# Patient Record
Sex: FEMALE | Race: OTHER | Employment: FULL TIME | URBAN - METROPOLITAN AREA
[De-identification: names, ages, dates, MRNs, and addresses within clinical notes are randomized per-mention and may not be internally consistent; named-entity substitution may affect disease eponyms.]

---

## 2017-10-23 ENCOUNTER — HOSPITAL ENCOUNTER (OUTPATIENT)
Dept: PHYSICAL THERAPY | Age: 57
Discharge: HOME | End: 2017-10-23
Payer: COMMERCIAL

## 2017-10-23 PROCEDURE — 97162 PT EVAL MOD COMPLEX 30 MIN: CPT

## 2017-10-23 PROCEDURE — 97110 THERAPEUTIC EXERCISES: CPT

## 2017-10-23 NOTE — PROGRESS NOTES
Ambulatory/Rehab Services H2 Model Falls Risk Assessment    Risk Factor Pts. ·   Confusion/Disorientation/Impulsivity  []    4 ·   Symptomatic Depression  []   2 ·   Altered Elimination  []   1 ·   Dizziness/Vertigo  []   1 ·   Gender (Male)  []   1 ·   Any administered antiepileptics (anticonvulsants):  []   2 ·   Any administered benzodiazepines:  []   1 ·   Visual Impairment (specify):  []   1 ·   Portable Oxygen Use  []   1 ·   Orthostatic ? BP  []   1 ·   History of Recent Falls (within 3 mos.)  []   5     Ability to Rise from Chair (choose one) Pts. ·   Ability to rise in a single movement  []   0 ·   Pushes up, successful in one attempt  []   1 ·   Multiple attempts, but successful  []   3 ·   Unable to rise without assistance  []   4   Total: (5 or greater = High Risk) 0     Falls Prevention Plan:   []                Physical Limitations to Exercise (specify):   []                Mobility Assistance Device (type):   []                Exercise/Equipment Adaptation (specify):    ©2010 [facility] [address] Patent #[id number].  Federal Law prohibits the replication, distribution or use without written permission from [facility] [employer]

## 2017-10-23 NOTE — PROGRESS NOTES
Tyler Helton  : 1960  Payor: Mendez Mnedoza / Plan: Vitor Bergeron PPO / Product Type: PPO /  [id number]  [facility], [address]  [phone]   [phone]       OUTPATIENT PHYSICAL THERAPY:Initial Assessment and Treatment Day: Day of Assessment 10/23/2017    ICD-10: Treatment Diagnosis:  M75.51  Bursitis of right shoulder   M70.61  Trochanteric bursitis, right hip  Precautions/Allergies:   Review of patient's allergies indicates no known allergies. Fall Risk Score: 0 (? 5 = High Risk)  MD Orders: PT EVAL AND TREATMENT BURSITIS RIGHT SHOULDER and RIGHT HIP BURSITIS  MEDICAL/REFERRING DIAGNOSIS:  Bursitis of right shoulder [M75.51]  Trochanteric bursitis, right hip [M70.61]   DATE OF ONSET: 2017   REFERRING PHYSICIAN: Stanislav Sarabia MD  RETURN PHYSICIAN APPOINTMENT:  Pending      INITIAL ASSESSMENT:    Tyler Helton is a  pleasant middle aged female with poor scapular posture and weakness. There is point tenderness in the right supraspinatus region, also in the right greater trochanter region. She demonstrates pain with upper elevation and (+) IT band test. Skilled PT is indicated for this patient's right shoulder for strengthening and mobility. Right                              AROM   STRENGTH   PROM  NORMALS  Flexion                    140*         3+                 150*              160  ABDUCTION          120*         3+                 130 *              160  Extension                60            4                      60                 60  ER                              60            4                     65                 85   IR                               L3             4                      50                 T12   Scapular strength at 3/5 . PROBLEM LIST (Impacting functional limitations):  1. Decreased Strength  2.  Decreased ADL/Functional Activities  3. Decreased Activity Tolerance  4. Decreased Work Simplification/Energy Conservation Techniques  5. Decreased Flexibility/Joint Mobility  6. Decreased [place] with Home Exercise Program    INTERVENTIONS PLANNED :   1. Manual Therapy   2. Therapeutic Exercise   3. Modalities Cold and Hot    TREATMENT PLAN:  Effective Dates: 10/23/2017 TO 1/23/2018    Frequency/Duration: up to 3 times a week for 12 weeks for right shoulder and right hip dysfunction. GOALS: (Goals have been discussed and agreed upon with patient.)  Rehabilitation Potential For Stated Goals: Good  Time Frame: 6 weeks   1. Decrease pain below 3/10 for the right shoulder with beginning home ADL's / shoulder mobility. 2. Decrease DASH to 25 in the right shoulder to indicate functional improvement and to demonstrate improved range of motion and functional improvement of the shoulder. 3. Patient to be independent with an initial HEP for the right shoulder for mobility and strengthening. DISCHARGE GOALS:   12 weeks       1. Decrease pain below 1/10 for the left shoulder and right hip with home ADL's / shoulder/hip  mobility. 2. Decrease DASH to 15 in the right shoulder to indicate functional improvement and to demonstrate improved strength and mobility of the right shoulder    3. Patient to be independent with an advanced HEP for the right shoulder and right hip for mobility and strengthening. Regarding [employer] therapy, I certify that the treatment plan above will be carried out by a therapist or under their direction. Thank you for this referral,  Dayanna Lee PT     Referring Physician Signature: Barry Lizarraga MD              Date                    The information in this section was collected on 10/23/2017 (except where otherwise noted).     HISTORY:   History of Present Injury/Illness (Reason for Referral):  Patient is describing pain in the right shoulder with impingement type elevation especially with initiation of abduction . Scapular weakness is present and additional history of frozen shoulder. She is reporting flare up of this pain for the past three months. She is also reporting pain in the right hip greater trochanter region. Past Medical History/Comorbidities:   Ms. Darcy Cash  has a past medical history of Nausea & vomiting. She also has no past medical history of Aneurysm ([address] Arrhythmia; Arthritis; Asthma; Autoimmune disease ([address] CAD (coronary artery disease); Cancer ([address] Chronic kidney disease; Chronic pain; Coagulation defects; COPD; Diabetes ([address] Difficult intubation; GERD (gastroesophageal reflux disease); Heart failure ([address] Hypertension; Liver disease; Malignant hyperthermia due to anesthesia; Morbid obesity ([address] Other ill-defined conditions(799.89); Pseudocholinesterase deficiency; Psychiatric disorder; PUD (peptic ulcer disease); Seizures ([facility] Stroke [facility]); Thromboembolus ([facility] [address] Thyroid disease; Unspecified adverse effect of anesthesia; or Unspecified sleep apnea. Ms. Darcy Cash  has a past surgical history that includes tonsillectomy. Social History/Living Environment:     No limitations. Prior Level of Function/Work/Activity:  Patient had been independent with all functional mobility. Reports prior decrease in shoulder ROM secondary to prior frozen shoulder. Dominant Side:         RIGHTPersonal Factors:          Sex:  female        Age:  49 y.o. Current Medications:       Current Outpatient Prescriptions:     estradiol (ESTRACE) 1 mg tablet, Take 1 Tab by mouth daily. , Disp: 30 Tab, Rfl: 0    medroxyPROGESTERone (PROVERA) 2.5 mg tablet, Take 1 Tab by mouth daily. , Disp: 30 Tab, Rfl: 0    ketorolac (TORADOL) 10 mg tablet, Take 1 Tab by mouth every six (6) hours as needed for Pain., Disp: 20 Tab, Rfl: 0    promethazine (PHENERGAN) 25 mg tablet, Take 1 Tab by mouth every six (6) hours as needed for Nausea., Disp: 30 Tab, Rfl: 0    citalopram (CELEXA) 40 mg tablet, Take 40 mg by mouth nightly.  , Disp: , Rfl:    Date Last Reviewed:  10/24/2017     Number of Personal Factors/Comorbidities that affect the Plan of Care: 1-2: MODERATE COMPLEXITY   EXAMINATION:   Right  Hip abduction at 3/5 and palpably tender right greater trochanter region.   (+) OBERS   Right Shoulder                              AROM   STRENGTH   PROM  NORMALS  Flexion                    140*        3+                 150*              160  ABDUCTION          120*         3+                130 *              160  Extension               60            4                  60                 60  ER                             60            4                 65                 85   IR                              L3            4                  50               T12  *indicates antalgic mobility     Strength:See Above    Palpation :    Palpably tender right supraspinatus and right greater trochanter region. Special Tests:     (+) OBERS   Neurological Screen:    Myotomes: intact. Dermatomes: intact bilateral UE's light touch and proprioception. Reflexes: normal          Functional Mobility: intact  Balance: intact           Body Structures Involved:  1. Metabolic  2. Bones  3. Joints  4. Muscles  5. Ligaments Body Functions Affected:  1. Sensory/Pain  2. Neuromusculoskeletal  3. Movement Related  4. Skin Related  5. Metobolic/Endocrine Activities and Participation Affected:  1. Learning and Applying Knowledge  2. General Tasks and Demands  3. Mobility  4. Interpersonal Interactions and Relationships  5. Community, Social and [place]   Number of elements (examined above) that affect the Plan of Care: 3: MODERATE COMPLEXITY   CLINICAL PRESENTATION:   Presentation: Evolving clinical presentation with changing clinical characteristics: MODERATE COMPLEXITY   CLINICAL DECISION MAKING:   Outcome Measure:    Tool Used: Disabilities of the Arm, Shoulder and Hand (DASH) Questionnaire - Quick Version  Score:  Initial: 26/55  Most Recent: X/55 (Date: -- )   Interpretation of Score: The DASH is designed to measure the activities of daily living in person's with upper extremity dysfunction or pain. Each section is scored on a 1-5 scale, 5 representing the greatest disability. The scores of each section are added together for a total score of 55. Score 11 12-19 20-28 29-37 38-45 46-54 55   Modifier CH CI CJ CK CL CM CN     ? Changing and Maintaining Body Position:     - CURRENT STATUS: CJ - 20%-39% impaired, limited or restricted    - GOAL STATUS: CI - 1%-19% impaired, limited or restricted    - D/C STATUS:  ---------------To be determined---------------       Lower Extremity Functional Scale (LEFS)  Score:  Initial: 66/80 Most Recent: X/80 (Date: -- )   Interpretation of Score: 20 questions each scored on a 5 point scale with 0 representing \"extreme difficulty or unable to perform\" and 4 representing \"no difficulty\". The lower the score, the greater the functional disability. 80/80 represents no disability. Minimal detectable change is 9 points. Score 80 79-63 62-48 47-32 31-16 15-1 0   Modifier CH CI CJ CK CL CM CN         Medical Necessity:   · Patient demonstrates good rehab potential due to higher previous functional level. Reason for Services/Other Comments:  · Patient continues to require skilled intervention due to medical complications, patient unable to attend/participate in therapy as expected and right shoulder impingement and pain with elevation. .   Use of outcome tool(s) and clinical judgement create a POC that gives a: Questionable prediction of patient's progress: MODERATE COMPLEXITY            TREATMENT:   (In addition to Assessment/Re-Assessment sessions the following treatments were rendered)  Pre-treatment Symptoms/Complaints:  Right shoulder at 5/10 with elevation and abduction   Pain: Initial:     5/10 in the right shoulder , 1/10 in the right greater trochanter region  Post Session:  4/10 with elevation in the right shoulder, 1/10 right greater trochanter region. THERAPEUTIC EXERCISE: (15 minutes):  Exercises per grid below to improve mobility, strength, balance and dynamic movement of shoulder - right to improve functional lifting, carrying, reaching and catching. Required minimal visual, verbal, manual and tactile cues to promote proper body alignment. Progressed repetitions as indicated. Date:  10/23/2017 Date:   Date:     Activity/Exercise Parameters Parameters Parameters   Upper trap shrugs  10 x's      Scapular clocks  15 x's      IT band stretch right side  3 x's      Postural reeducation  5 mins                          [employer] Portal  Treatment/Session Assessment:    · Response to Treatment: No specific pain complaints post evaluation and treatment. · Compliance with Program/Exercises: Will assess as treatment progresses. · Recommendations/Intent for next treatment session: \"Next visit will focus on advancements to more challenging activities, reduction in assistance provided and right shoulder mobility and scapular strengthening to decrease impingement in the right shoulder. \".   Total Treatment Duration:  PT Patient Time In/Time Out  Time In: 9780  Time Out: Shakira Parekh

## 2017-10-26 ENCOUNTER — HOSPITAL ENCOUNTER (OUTPATIENT)
Dept: PHYSICAL THERAPY | Age: 57
Discharge: HOME | End: 2017-10-26
Payer: COMMERCIAL

## 2017-10-26 PROCEDURE — 97140 MANUAL THERAPY 1/> REGIONS: CPT

## 2017-10-26 PROCEDURE — 97110 THERAPEUTIC EXERCISES: CPT

## 2017-10-26 NOTE — PROGRESS NOTES
Henry Zepeda  : 1960  Payor: Beba Emerson / Plan: Saul Ramirez PPO / Product Type: PPO /  [address]  at [facility]  [address] [facility], [place] [address] [address] [facility]  [phone]   [phone]       OUTPATIENT PHYSICAL THERAPY:Daily Note 10/26/2017    ICD-10: Treatment Diagnosis:  M75.51  Bursitis of right shoulder   M70.61  Trochanteric bursitis, right hip  Precautions/Allergies:   Review of patient's allergies indicates no known allergies. Fall Risk Score: 0 (? 5 = High Risk)  MD Orders: PT EVAL AND TREATMENT BURSITIS RIGHT SHOULDER and RIGHT HIP BURSITIS  MEDICAL/REFERRING DIAGNOSIS:  Bursitis of right shoulder [M75.51]  Trochanteric bursitis, right hip [M70.61]   DATE OF ONSET: 2017   REFERRING PHYSICIAN: Marnie Naqvi MD  RETURN PHYSICIAN APPOINTMENT:  Pending      INITIAL ASSESSMENT:    Henry Zepeda is a  pleasant middle aged female with poor scapular posture and weakness. There is point tenderness in the right supraspinatus region, also in the right greater trochanter region. She demonstrates pain with upper elevation and (+) IT band test. Skilled PT is indicated for this patient's right shoulder for strengthening and mobility. Right                              AROM   STRENGTH   PROM  NORMALS  Flexion                    140*         3+                 150*              160  ABDUCTION          120*         3+                 130 *              160  Extension                60            4                      60                 60  ER                              60            4                     65                 85   IR                               L3             4                      50                 T12   Scapular strength at 3/5 . PROBLEM LIST (Impacting functional limitations):  1. Decreased Strength  2. Decreased ADL/Functional Activities  3.  Decreased Activity Tolerance  4. Decreased Work Simplification/Energy Conservation Techniques  5. Decreased Flexibility/Joint Mobility  6. Decreased [place] with Home Exercise Program    INTERVENTIONS PLANNED :   1. Manual Therapy   2. Therapeutic Exercise   3. Modalities Cold and Hot    TREATMENT PLAN:  Effective Dates: 10/23/2017 TO 1/23/2018    Frequency/Duration: up to 3 times a week for 12 weeks for right shoulder and right hip dysfunction. GOALS: (Goals have been discussed and agreed upon with patient.)  Rehabilitation Potential For Stated Goals: Good  Time Frame: 6 weeks   1. Decrease pain below 3/10 for the right shoulder with beginning home ADL's / shoulder mobility. 2. Decrease DASH to 25 in the right shoulder to indicate functional improvement and to demonstrate improved range of motion and functional improvement of the shoulder. 3. Patient to be independent with an initial HEP for the right shoulder for mobility and strengthening. DISCHARGE GOALS:   12 weeks       1. Decrease pain below 1/10 for the left shoulder and right hip with home ADL's / shoulder/hip  mobility. 2. Decrease DASH to 15 in the right shoulder to indicate functional improvement and to demonstrate improved strength and mobility of the right shoulder    3. Patient to be independent with an advanced HEP for the right shoulder and right hip for mobility and strengthening. Regarding [employer] therapy, I certify that the treatment plan above will be carried out by a therapist or under their direction. Thank you for this referral,  Mark Lozano PT     Referring Physician Signature: Valentina Awad MD              Date                    The information in this section was collected on 10/23/2017 (except where otherwise noted).     HISTORY:   History of Present Injury/Illness (Reason for Referral):  Patient is describing pain in the right shoulder with impingement type elevation especially with initiation of abduction . Scapular weakness is present and additional history of frozen shoulder. She is reporting flare up of this pain for the past three months. She is also reporting pain in the right hip greater trochanter region. Past Medical History/Comorbidities:   Ms. Ana Sarabia  has a past medical history of Nausea & vomiting. She also has no past medical history of Aneurysm ([address] Arrhythmia; Arthritis; Asthma; Autoimmune disease ([address] CAD (coronary artery disease); Cancer ([address] Chronic kidney disease; Chronic pain; Coagulation defects; COPD; Diabetes ([address] Difficult intubation; GERD (gastroesophageal reflux disease); Heart failure ([address] Hypertension; Liver disease; Malignant hyperthermia due to anesthesia; Morbid obesity ([address] Other ill-defined conditions(799[address]89); Pseudocholinesterase deficiency; Psychiatric disorder; PUD (peptic ulcer disease); Seizures ([facility] [address] Stroke [facility]); Thromboembolus ([facility] [address] [facility] Thyroid disease; Unspecified adverse effect of anesthesia; or Unspecified sleep apnea. Ms. Ana Sarabia  has a past surgical history that includes tonsillectomy. Social History/Living Environment:     No limitations. Prior Level of Function/Work/Activity:  Patient had been independent with all functional mobility. Reports prior decrease in shoulder ROM secondary to prior frozen shoulder. Dominant Side:         RIGHTPersonal Factors:          Sex:  female        Age:  49 y.o. Current Medications:       Current Outpatient Prescriptions:     estradiol (ESTRACE) 1 mg tablet, Take 1 Tab by mouth daily. , Disp: 30 Tab, Rfl: 0    medroxyPROGESTERone (PROVERA) 2.5 mg tablet, Take 1 Tab by mouth daily. , Disp: 30 Tab, Rfl: 0    ketorolac (TORADOL) 10 mg tablet, Take 1 Tab by mouth every six (6) hours as needed for Pain., Disp: 20 Tab, Rfl: 0    promethazine (PHENERGAN) 25 mg tablet, Take 1 Tab by mouth every six (6) hours as needed for Nausea., Disp: 30 Tab, Rfl: 0    citalopram (CELEXA) 40 mg tablet, Take 40 mg by mouth nightly.  , Disp: , Rfl:    Date Last Reviewed:  10/29/2017     Number of Personal Factors/Comorbidities that affect the Plan of Care: 1-2: MODERATE COMPLEXITY   EXAMINATION:   Right  Hip abduction at 3/5 and palpably tender right greater trochanter region.   (+) OBERS   Right Shoulder                              AROM   STRENGTH   PROM  NORMALS  Flexion                    140*        3+                 150*              160  ABDUCTION          120*         3+                130 *              160  Extension               60            4                  60                 60  ER                             60            4                 65                 85   IR                              L3            4                  50               T12  *indicates antalgic mobility     Strength:See Above    Palpation :    Palpably tender right supraspinatus and right greater trochanter region. Special Tests:     (+) OBERS   Neurological Screen:    Myotomes: intact. Dermatomes: intact bilateral UE's light touch and proprioception. Reflexes: normal          Functional Mobility: intact  Balance: intact           Body Structures Involved:  1. Metabolic  2. Bones  3. Joints  4. Muscles  5. Ligaments Body Functions Affected:  1. Sensory/Pain  2. Neuromusculoskeletal  3. Movement Related  4. Skin Related  5. Metobolic/Endocrine Activities and Participation Affected:  1. Learning and Applying Knowledge  2. General Tasks and Demands  3. Mobility  4. Interpersonal Interactions and Relationships  5. Community, Social and [place]   Number of elements (examined above) that affect the Plan of Care: 3: MODERATE COMPLEXITY   CLINICAL PRESENTATION:   Presentation: Evolving clinical presentation with changing clinical characteristics: MODERATE COMPLEXITY   CLINICAL DECISION MAKING:   Outcome Measure:    Tool Used: Disabilities of the Arm, Shoulder and Hand (DASH) Questionnaire - Quick Version  Score:  Initial: 26/55  Most Recent: X/55 (Date: -- )   Interpretation of Score: The DASH is designed to measure the activities of daily living in person's with upper extremity dysfunction or pain. Each section is scored on a 1-5 scale, 5 representing the greatest disability. The scores of each section are added together for a total score of 55. Score 11 12-19 20-28 29-37 38-45 46-54 55   Modifier CH CI CJ CK CL CM CN     ? Changing and Maintaining Body Position:     - CURRENT STATUS: CJ - 20%-39% impaired, limited or restricted    - GOAL STATUS: CI - 1%-19% impaired, limited or restricted    - D/C STATUS:  ---------------To be determined---------------       Lower Extremity Functional Scale (LEFS)  Score:  Initial: 66/80 Most Recent: X/80 (Date: -- )   Interpretation of Score: 20 questions each scored on a 5 point scale with 0 representing \"extreme difficulty or unable to perform\" and 4 representing \"no difficulty\". The lower the score, the greater the functional disability. 80/80 represents no disability. Minimal detectable change is 9 points. Score 80 79-63 62-48 47-32 31-16 15-1 0   Modifier CH CI CJ CK CL CM CN         Medical Necessity:   · Patient demonstrates good rehab potential due to higher previous functional level. Reason for Services/Other Comments:  · Patient continues to require skilled intervention due to medical complications, patient unable to attend/participate in therapy as expected and right shoulder impingement and pain with elevation. .   Use of outcome tool(s) and clinical judgement create a POC that gives a: Questionable prediction of patient's progress: MODERATE COMPLEXITY            TREATMENT:   (In addition to Assessment/Re-Assessment sessions the following treatments were rendered)  Pre-treatment Symptoms/Complaints:  Right shoulder at 5/10 with elevation and abduction   Pain: Initial:     5/10 in the right shoulder , 1/10 in the right greater trochanter region  Post Session:  4/10 with elevation in the right shoulder, 1/10 right greater trochanter region. THERAPEUTIC EXERCISE: (30 minutes):  Exercises per grid below to improve mobility, strength, balance and dynamic movement of shoulder - right to improve functional lifting, carrying, reaching and catching. Required minimal visual, verbal, manual and tactile cues to promote proper body alignment. Progressed repetitions as indicated. Date:  10/26/2017 Date:   Date:     Activity/Exercise Parameters Parameters Parameters   Upper trap shrugs  10 x's      Scapular clocks  15 x's      IT band stretch right side  3 x's      Postural reeducation  5 mins      UBE  3.0 3 mins forward and 3 mins backward      Against wall [place]  10 x's            MANUAL THERAPY: (10 mins  minutes): Joint mobilization, Soft tissue mobilization and Manipulation was utilized and necessary because of the patient's restricted joint motion and restricted motion of soft tissue. Seated effleurage and petrissage and digital accupressure to the scapular region and rhomboids for trigger point release. Modalities for pain control 5 min :   Right shoulder and scapular region with layered moist hot pack and increase blood flow. [facility] Portal  Treatment/Session Assessment:    · Response to Treatment: No specific pain complaints post treatment. Patient demonstrates poor postural control and decreased strength in the scapular musculature. · Compliance with Program/Exercises: initial postural strengthening and stabilization. · Recommendations/Intent for next treatment session: \"Next visit will focus on advancements to more challenging activities, reduction in assistance provided and right shoulder mobility and scapular strengthening to decrease impingement in the right shoulder. \".   Total Treatment Duration:  PT Patient Time In/Time Out  Time In: 2635  Time Out: 53 Adam Kumar

## 2017-10-30 ENCOUNTER — HOSPITAL ENCOUNTER (OUTPATIENT)
Dept: PHYSICAL THERAPY | Age: 57
Discharge: HOME | End: 2017-10-30
Payer: COMMERCIAL

## 2017-10-30 PROCEDURE — 97110 THERAPEUTIC EXERCISES: CPT

## 2017-10-30 PROCEDURE — 97140 MANUAL THERAPY 1/> REGIONS: CPT

## 2017-10-31 NOTE — PROGRESS NOTES
Stacy Staton  : 1960  Payor: Rodger Boone / Plan: Cici Renee PPO / Product Type: PPO /  [address]  at [facility]  [facility], [address] [place] [address]  [phone]   [phone]       OUTPATIENT PHYSICAL THERAPY:Daily Note 10/30/2017    ICD-10: Treatment Diagnosis:  M75.51  Bursitis of right shoulder   M70.61  Trochanteric bursitis, right hip  Precautions/Allergies:   Review of patient's allergies indicates no known allergies. Fall Risk Score: 0 (? 5 = High Risk)  MD Orders: PT EVAL AND TREATMENT BURSITIS RIGHT SHOULDER and RIGHT HIP BURSITIS  MEDICAL/REFERRING DIAGNOSIS:  Bursitis of right shoulder [M75.51]  Trochanteric bursitis, right hip [M70.61]   DATE OF ONSET: 2017   REFERRING PHYSICIAN: Sharee Fink MD  RETURN PHYSICIAN APPOINTMENT:  Pending      INITIAL ASSESSMENT:    Stacy Staton is a  pleasant middle aged female with poor scapular posture and weakness. There is point tenderness in the right supraspinatus region, also in the right greater trochanter region. She demonstrates pain with upper elevation and (+) IT band test. Skilled PT is indicated for this patient's right shoulder for strengthening and mobility. Right                              AROM   STRENGTH   PROM  NORMALS  Flexion                    140*         3+                 150*              160  ABDUCTION          120*         3+                 130 *              160  Extension                60            4                      60                 60  ER                              60            4                     65                 85   IR                               L3             4                      50                 T12   Scapular strength at 3/5 . PROBLEM LIST (Impacting functional limitations):  1. Decreased Strength  2. Decreased ADL/Functional Activities  3.  Decreased Activity Tolerance  4. Decreased Work Simplification/Energy Conservation Techniques  5. Decreased Flexibility/Joint Mobility  6. Decreased [place] with Home Exercise Program    INTERVENTIONS PLANNED :   1. Manual Therapy   2. Therapeutic Exercise   3. Modalities Cold and Hot    TREATMENT PLAN:  Effective Dates: 10/23/2017 TO 1/23/2018    Frequency/Duration: up to 3 times a week for 12 weeks for right shoulder and right hip dysfunction. GOALS: (Goals have been discussed and agreed upon with patient.)  Rehabilitation Potential For Stated Goals: Good  Time Frame: 6 weeks   1. Decrease pain below 3/10 for the right shoulder with beginning home ADL's / shoulder mobility. 2. Decrease DASH to 25 in the right shoulder to indicate functional improvement and to demonstrate improved range of motion and functional improvement of the shoulder. 3. Patient to be independent with an initial HEP for the right shoulder for mobility and strengthening. DISCHARGE GOALS:   12 weeks       1. Decrease pain below 1/10 for the left shoulder and right hip with home ADL's / shoulder/hip  mobility. 2. Decrease DASH to 15 in the right shoulder to indicate functional improvement and to demonstrate improved strength and mobility of the right shoulder    3. Patient to be independent with an advanced HEP for the right shoulder and right hip for mobility and strengthening. Regarding [employer] therapy, I certify that the treatment plan above will be carried out by a therapist or under their direction. Thank you for this referral,  Izzy Beard PT     Referring Physician Signature: Crystal Estes MD              Date                    The information in this section was collected on 10/23/2017 (except where otherwise noted).     HISTORY:   History of Present Injury/Illness (Reason for Referral):  Patient is describing pain in the right shoulder with impingement type elevation especially with initiation of abduction . Scapular weakness is present and additional history of frozen shoulder. She is reporting flare up of this pain for the past three months. She is also reporting pain in the right hip greater trochanter region. Past Medical History/Comorbidities:   Ms. Moshe Padgett  has a past medical history of Nausea & vomiting. She also has no past medical history of Aneurysm ([address] Arrhythmia; Arthritis; Asthma; Autoimmune disease ([address] CAD (coronary artery disease); Cancer ([address] Chronic kidney disease; Chronic pain; Coagulation defects; COPD; Diabetes ([address] Difficult intubation; GERD (gastroesophageal reflux disease); Heart failure ([address] Hypertension; Liver disease; Malignant hyperthermia due to anesthesia; Morbid obesity ([address] Other ill-defined conditions(799.89); Pseudocholinesterase deficiency; Psychiatric disorder; PUD (peptic ulcer disease); Seizures ([address] Stroke [facility]); Thromboembolus ([address] Thyroid disease; Unspecified adverse effect of anesthesia; or Unspecified sleep apnea. Ms. Moshe Padgett  has a past surgical history that includes tonsillectomy. Social History/Living Environment:     No limitations. Prior Level of Function/Work/Activity:  Patient had been independent with all functional mobility. Reports prior decrease in shoulder ROM secondary to prior frozen shoulder. Dominant Side:         RIGHTPersonal Factors:          Sex:  female        Age:  49 y.o. Current Medications:       Current Outpatient Prescriptions:     estradiol (ESTRACE) 1 mg tablet, Take 1 Tab by mouth daily. , Disp: 30 Tab, Rfl: 0    medroxyPROGESTERone (PROVERA) 2.5 mg tablet, Take 1 Tab by mouth daily. , Disp: 30 Tab, Rfl: 0    ketorolac (TORADOL) 10 mg tablet, Take 1 Tab by mouth every six (6) hours as needed for Pain., Disp: 20 Tab, Rfl: 0    promethazine (PHENERGAN) 25 mg tablet, Take 1 Tab by mouth every six (6) hours as needed for Nausea., Disp: 30 Tab, Rfl: 0    citalopram (CELEXA) 40 mg tablet, Take 40 mg by mouth nightly.  , Disp: , Rfl:    Date Last Reviewed:  10/31/2017     Number of Personal Factors/Comorbidities that affect the Plan of Care: 1-2: MODERATE COMPLEXITY   EXAMINATION:   Right  Hip abduction at 3/5 and palpably tender right greater trochanter region.   (+) OBERS   Right Shoulder                              AROM   STRENGTH   PROM  NORMALS  Flexion                    140*        3+                 150*              160  ABDUCTION          120*         3+                130 *              160  Extension               60            4                  60                 60  ER                             60            4                 65                 85   IR                              L3            4                  50               T12  *indicates antalgic mobility     Strength:See Above    Palpation :    Palpably tender right supraspinatus and right greater trochanter region. Special Tests:     (+) OBERS   Neurological Screen:    Myotomes: intact. Dermatomes: intact bilateral UE's light touch and proprioception. Reflexes: normal          Functional Mobility: intact  Balance: intact           Body Structures Involved:  1. Metabolic  2. Bones  3. Joints  4. Muscles  5. Ligaments Body Functions Affected:  1. Sensory/Pain  2. Neuromusculoskeletal  3. Movement Related  4. Skin Related  5. Metobolic/Endocrine Activities and Participation Affected:  1. Learning and Applying Knowledge  2. General Tasks and Demands  3. Mobility  4. Interpersonal Interactions and Relationships  5. Community, Social and [place]   Number of elements (examined above) that affect the Plan of Care: 3: MODERATE COMPLEXITY   CLINICAL PRESENTATION:   Presentation: Evolving clinical presentation with changing clinical characteristics: MODERATE COMPLEXITY   CLINICAL DECISION MAKING:   Outcome Measure:    Tool Used: Disabilities of the Arm, Shoulder and Hand (DASH) Questionnaire - Quick Version  Score:  Initial: 26/55  Most Recent: X/55 (Date: -- )   Interpretation of Score: The DASH is designed to measure the activities of daily living in person's with upper extremity dysfunction or pain. Each section is scored on a 1-5 scale, 5 representing the greatest disability. The scores of each section are added together for a total score of 55. Score 11 12-19 20-28 29-37 38-45 46-54 55   Modifier CH CI CJ CK CL CM CN     ? Changing and Maintaining Body Position:     - CURRENT STATUS: CJ - 20%-39% impaired, limited or restricted    - GOAL STATUS: CI - 1%-19% impaired, limited or restricted    - D/C STATUS:  ---------------To be determined---------------       Lower Extremity Functional Scale (LEFS)  Score:  Initial: 66/80 Most Recent: X/80 (Date: -- )   Interpretation of Score: 20 questions each scored on a 5 point scale with 0 representing \"extreme difficulty or unable to perform\" and 4 representing \"no difficulty\". The lower the score, the greater the functional disability. 80/80 represents no disability. Minimal detectable change is 9 points. Score 80 79-63 62-48 47-32 31-16 15-1 0   Modifier  CI CJ CK CL CM CN         Medical Necessity:   · Patient demonstrates good rehab potential due to higher previous functional level. Reason for Services/Other Comments:  · Patient continues to require skilled intervention due to medical complications, patient unable to attend/participate in therapy as expected and right shoulder impingement and pain with elevation. .   Use of outcome tool(s) and clinical judgement create a POC that gives a: Questionable prediction of patient's progress: MODERATE COMPLEXITY            TREATMENT:   (In addition to Assessment/Re-Assessment sessions the following treatments were rendered)  Pre-treatment Symptoms/Complaints:  Right shoulder at 5/10 with elevation and abduction   Pain: Initial:     5/10 in the right shoulder , 1/10 in the right greater trochanter region  Post Session:  4/10 with elevation in the right shoulder, 1/10 right greater trochanter region. THERAPEUTIC EXERCISE: (30 minutes):  Exercises per grid below to improve mobility, strength, balance and dynamic movement of shoulder - right to improve functional lifting, carrying, reaching and catching. Required minimal visual, verbal, manual and tactile cues to promote proper body alignment. Progressed repetitions as indicated. Date:  10/30/2017 Date:   Date:     Activity/Exercise Parameters Parameters Parameters   Upper trap shrugs  10 x's      Scapular clocks  15 x's      IT band stretch right side  3 x's      Postural reeducation  5 mins      UBE  3.0 3 mins forward and 3 mins backward      Against wall [place]  10 x's      Middle trap       MANUAL THERAPY: (10 mins  minutes): Joint mobilization, Soft tissue mobilization and Manipulation was utilized and necessary because of the patient's restricted joint motion and restricted motion of soft tissue. Seated effleurage and petrissage and digital accupressure to the scapular region and rhomboids for trigger point release. And also grade II-III glide in the shoulder inferior   Modalities for pain control 5 min :   Right shoulder and scapular region with layered moist hot pack and increase blood flow. MedBridge Portal  Treatment/Session Assessment:    · Response to Treatment: No specific pain complaints post treatment. Patient demonstrates poor postural control and decreased strength in the scapular musculature. · Compliance with Program/Exercises: initial postural strengthening and stabilization. · Recommendations/Intent for next treatment session: \"Next visit will focus on advancements to more challenging activities, reduction in assistance provided and right shoulder mobility and scapular strengthening to decrease impingement in the right shoulder. \".   Total Treatment Duration:  PT Patient Time In/Time Out  Time In: 1600  Time Out: [address] [facility] [address]

## 2017-11-02 ENCOUNTER — HOSPITAL ENCOUNTER (OUTPATIENT)
Dept: PHYSICAL THERAPY | Age: 57
Discharge: HOME | End: 2017-11-02
Payer: COMMERCIAL

## 2017-11-06 ENCOUNTER — HOSPITAL ENCOUNTER (OUTPATIENT)
Dept: PHYSICAL THERAPY | Age: 57
Discharge: HOME | End: 2017-11-06
Payer: COMMERCIAL

## 2017-11-06 PROCEDURE — 97140 MANUAL THERAPY 1/> REGIONS: CPT

## 2017-11-06 PROCEDURE — 97110 THERAPEUTIC EXERCISES: CPT

## 2017-11-06 NOTE — PROGRESS NOTES
Juliet Guzman  : 1960  Payor: Ritesh Barrios / Plan: Jered Valente PPO / Product Type: PPO /  [address]  at [facility]  [address] [birth year], [address] [place], [place] [address] [address] [facility]  [phone]   [phone]       OUTPATIENT PHYSICAL THERAPY:Daily Note 2017    ICD-10: Treatment Diagnosis:  M75.51  Bursitis of right shoulder   M70.61  Trochanteric bursitis, right hip  Precautions/Allergies:   Review of patient's allergies indicates no known allergies. Fall Risk Score: 0 (? 5 = High Risk)  MD Orders: PT EVAL AND TREATMENT BURSITIS RIGHT SHOULDER and RIGHT HIP BURSITIS  MEDICAL/REFERRING DIAGNOSIS:  Bursitis of right shoulder [M75.51]  Trochanteric bursitis, right hip [M70.61]   DATE OF ONSET: 2017   REFERRING PHYSICIAN: Darius Montague MD  RETURN PHYSICIAN APPOINTMENT:  Pending      INITIAL ASSESSMENT:    Juliet Guzman is a  pleasant middle aged female with poor scapular posture and weakness. There is point tenderness in the right supraspinatus region, also in the right greater trochanter region. She demonstrates pain with upper elevation and (+) IT band test. Skilled PT is indicated for this patient's right shoulder for strengthening and mobility. Right                              AROM   STRENGTH   PROM  NORMALS  Flexion                    140*         3+                 150*              160  ABDUCTION          120*         3+                 130 *              160  Extension                60            4                      60                 60  ER                              60            4                     65                 85   IR                               L3             4                      50                 T12   Scapular strength at 3/5 . PROBLEM LIST (Impacting functional limitations):  1. Decreased Strength  2. Decreased ADL/Functional Activities  3.  Decreased Activity Tolerance  4. Decreased Work Simplification/Energy Conservation Techniques  5. Decreased Flexibility/Joint Mobility  6. Decreased [place] with Home Exercise Program    INTERVENTIONS PLANNED :   1. Manual Therapy   2. Therapeutic Exercise   3. Modalities Cold and Hot    TREATMENT PLAN:  Effective Dates: 10/23/2017 TO 1/23/2018    Frequency/Duration: up to 3 times a week for 12 weeks for right shoulder and right hip dysfunction. GOALS: (Goals have been discussed and agreed upon with patient.)  Rehabilitation Potential For Stated Goals: Good  Time Frame: 6 weeks   1. Decrease pain below 3/10 for the right shoulder with beginning home ADL's / shoulder mobility. 2. Decrease DASH to 25 in the right shoulder to indicate functional improvement and to demonstrate improved range of motion and functional improvement of the shoulder. 3. Patient to be independent with an initial HEP for the right shoulder for mobility and strengthening. DISCHARGE GOALS:   12 weeks       1. Decrease pain below 1/10 for the left shoulder and right hip with home ADL's / shoulder/hip  mobility. 2. Decrease DASH to 15 in the right shoulder to indicate functional improvement and to demonstrate improved strength and mobility of the right shoulder    3. Patient to be independent with an advanced HEP for the right shoulder and right hip for mobility and strengthening. Regarding [employer] therapy, I certify that the treatment plan above will be carried out by a therapist or under their direction. Thank you for this referral,  Izzy Beard PT     Referring Physician Signature: Crystal Estes MD              Date                    The information in this section was collected on 10/23/2017 (except where otherwise noted).     HISTORY:   History of Present Injury/Illness (Reason for Referral):  Patient is describing pain in the right shoulder with impingement type elevation especially with initiation of abduction . Scapular weakness is present and additional history of frozen shoulder. She is reporting flare up of this pain for the past three months. She is also reporting pain in the right hip greater trochanter region. Past Medical History/Comorbidities:   Ms. Eddie Estrella  has a past medical history of Nausea & vomiting. She also has no past medical history of Aneurysm ([address] Arrhythmia; Arthritis; Asthma; Autoimmune disease ([address] CAD (coronary artery disease); Cancer ([address] Chronic kidney disease; Chronic pain; Coagulation defects; COPD; Diabetes ([address] Difficult intubation; GERD (gastroesophageal reflux disease); Heart failure ([address] Hypertension; Liver disease; Malignant hyperthermia due to anesthesia; Morbid obesity ([address] Other ill-defined conditions(799.89); Pseudocholinesterase deficiency; Psychiatric disorder; PUD (peptic ulcer disease); Seizures ([facility] Stroke [facility]); Thromboembolus ([facility] [address] Thyroid disease; Unspecified adverse effect of anesthesia; or Unspecified sleep apnea. Ms. Eddie Estrella  has a past surgical history that includes tonsillectomy. Social History/Living Environment:     No limitations. Prior Level of Function/Work/Activity:  Patient had been independent with all functional mobility. Reports prior decrease in shoulder ROM secondary to prior frozen shoulder. Dominant Side:         RIGHTPersonal Factors:          Sex:  female        Age:  49 y.o. Current Medications:       Current Outpatient Prescriptions:     estradiol (ESTRACE) 1 mg tablet, Take 1 Tab by mouth daily. , Disp: 30 Tab, Rfl: 0    medroxyPROGESTERone (PROVERA) 2.5 mg tablet, Take 1 Tab by mouth daily. , Disp: 30 Tab, Rfl: 0    ketorolac (TORADOL) 10 mg tablet, Take 1 Tab by mouth every six (6) hours as needed for Pain., Disp: 20 Tab, Rfl: 0    promethazine (PHENERGAN) 25 mg tablet, Take 1 Tab by mouth every six (6) hours as needed for Nausea., Disp: 30 Tab, Rfl: 0    citalopram (CELEXA) 40 mg tablet, Take 40 mg by mouth nightly.  , Disp: , Rfl:    Date Last Reviewed:  11/8/2017     Number of Personal Factors/Comorbidities that affect the Plan of Care: 1-2: MODERATE COMPLEXITY   EXAMINATION:   Right  Hip abduction at 3/5 and palpably tender right greater trochanter region.   (+) OBERS   Right Shoulder                              AROM   STRENGTH   PROM  NORMALS  Flexion                    140*        3+                 150*              160  ABDUCTION          120*         3+                130 *              160  Extension               60            4                  60                 60  ER                             60            4                 65                 85   IR                              L3            4                  50               T12  *indicates antalgic mobility     Strength:See Above    Palpation :    Palpably tender right supraspinatus and right greater trochanter region. Special Tests:     (+) OBERS   Neurological Screen:    Myotomes: intact. Dermatomes: intact bilateral UE's light touch and proprioception. Reflexes: normal          Functional Mobility: intact  Balance: intact           Body Structures Involved:  1. Metabolic  2. Bones  3. Joints  4. Muscles  5. Ligaments Body Functions Affected:  1. Sensory/Pain  2. Neuromusculoskeletal  3. Movement Related  4. Skin Related  5. Metobolic/Endocrine Activities and Participation Affected:  1. Learning and Applying Knowledge  2. General Tasks and Demands  3. Mobility  4. Interpersonal Interactions and Relationships  5. Community, Social and [place]   Number of elements (examined above) that affect the Plan of Care: 3: MODERATE COMPLEXITY   CLINICAL PRESENTATION:   Presentation: Evolving clinical presentation with changing clinical characteristics: MODERATE COMPLEXITY   CLINICAL DECISION MAKING:   Outcome Measure:    Tool Used: Disabilities of the Arm, Shoulder and Hand (DASH) Questionnaire - Quick Version  Score:  Initial: 26/55  Most Recent: X/55 (Date: -- )   Interpretation of Score: The DASH is designed to measure the activities of daily living in person's with upper extremity dysfunction or pain. Each section is scored on a 1-5 scale, 5 representing the greatest disability. The scores of each section are added together for a total score of 55. Score 11 12-19 20-28 29-37 38-45 46-54 55   Modifier  CI CJ CK CL CM CN     ? Changing and Maintaining Body Position:     - CURRENT STATUS: CJ - 20%-39% impaired, limited or restricted    - GOAL STATUS: CI - 1%-19% impaired, limited or restricted    - D/C STATUS:  ---------------To be determined---------------       Lower Extremity Functional Scale (LEFS)  Score:  Initial: 66/80 Most Recent: X/80 (Date: -- )   Interpretation of Score: 20 questions each scored on a 5 point scale with 0 representing \"extreme difficulty or unable to perform\" and 4 representing \"no difficulty\". The lower the score, the greater the functional disability. 80/80 represents no disability. Minimal detectable change is 9 points. Score 80 79-63 62-48 47-32 31-16 15-1 0   Modifier  CI CJ CK CL CM CN         Medical Necessity:   · Patient demonstrates good rehab potential due to higher previous functional level. Reason for Services/Other Comments:  · Patient continues to require skilled intervention due to medical complications, patient unable to attend/participate in therapy as expected and right shoulder impingement and pain with elevation. .   Use of outcome tool(s) and clinical judgement create a POC that gives a: Questionable prediction of patient's progress: MODERATE COMPLEXITY            TREATMENT:   (In addition to Assessment/Re-Assessment sessions the following treatments were rendered)  Pre-treatment Symptoms/Complaints:  Right shoulder at 4/10 with elevation and abduction   Pain: Initial:     4/10 in the right shoulder , 1/10 in the right greater trochanter region  Post Session:  3/10 with elevation in the right shoulder, 1/10 right greater trochanter region. THERAPEUTIC EXERCISE: (30 minutes):  Exercises per grid below to improve mobility, strength, balance and dynamic movement of shoulder - right to improve functional lifting, carrying, reaching and catching. Required minimal visual, verbal, manual and tactile cues to promote proper body alignment. Progressed repetitions as indicated. Date:  11/6/2017 Date:   Date:     Activity/Exercise Parameters Parameters Parameters   Upper trap shrugs  15 x's      Scapular clocks  15 x's      IT band stretch right side  3 x's      Postural reeducation  5 mins      UBE  3.0 3 mins forward and 3 mins backward      Against wall [place]  15 x's      Middle trap  15 x's      Lower Trap  15 x's      MANUAL THERAPY: (10 mins  minutes): Joint mobilization, Soft tissue mobilization and Manipulation was utilized and necessary because of the patient's restricted joint motion and restricted motion of soft tissue. Seated effleurage and petrissage and digital accupressure to the scapular region and rhomboids for trigger point release. And also grade II-III glide in the shoulder inferior   Modalities for pain control 5 min :   Right shoulder and scapular region with layered moist hot pack and increase blood flow. MedBridge Portal  Treatment/Session Assessment:    · Response to Treatment: No specific pain complaints post treatment. Patient demonstrates improving postural control and decreased strength in the scapular musculature. · Compliance with Program/Exercises: initial postural strengthening and stabilization. · Recommendations/Intent for next treatment session: \"Next visit will focus on advancements to more challenging activities, reduction in assistance provided and right shoulder mobility and scapular strengthening to decrease impingement in the right shoulder. \".   Total Treatment Duration:  PT Patient Time In/Time Out  Time In: 1544  Time Out: [address], [address] [facility]

## 2017-11-15 ENCOUNTER — HOSPITAL ENCOUNTER (OUTPATIENT)
Dept: PHYSICAL THERAPY | Age: 57
Discharge: HOME | End: 2017-11-15
Payer: COMMERCIAL

## 2017-11-15 PROCEDURE — 97110 THERAPEUTIC EXERCISES: CPT

## 2017-11-15 PROCEDURE — 97140 MANUAL THERAPY 1/> REGIONS: CPT

## 2017-11-15 NOTE — PROGRESS NOTES
Dallas Yanes  : 1960  Payor: Rhiannon Mir / Plan: Jie Matthew PPO / Product Type: PPO /  [address]  at [address] [facility] [address], [address] [facility], [place] [address] [address] [facility]  [phone]   [phone]       OUTPATIENT PHYSICAL THERAPY:Daily Note 11/15/2017    ICD-10: Treatment Diagnosis:  M75.51  Bursitis of right shoulder   M70.61  Trochanteric bursitis, right hip  Precautions/Allergies:   Review of patient's allergies indicates no known allergies. Fall Risk Score: 0 (? 5 = High Risk)  MD Orders: PT EVAL AND TREATMENT BURSITIS RIGHT SHOULDER and RIGHT HIP BURSITIS  MEDICAL/REFERRING DIAGNOSIS:  Bursitis of right shoulder [M75.51]  Trochanteric bursitis, right hip [M70.61]   DATE OF ONSET: 2017   REFERRING PHYSICIAN: Robel Acevedo MD  RETURN PHYSICIAN APPOINTMENT:  Pending      INITIAL ASSESSMENT:    Dallas Yanes is a  pleasant middle aged female with poor scapular posture and weakness. There is point tenderness in the right supraspinatus region, also in the right greater trochanter region. She demonstrates pain with upper elevation and (+) IT band test. Skilled PT is indicated for this patient's right shoulder for strengthening and mobility. Right                              AROM   STRENGTH   PROM  NORMALS  Flexion                    140*         3+                 150*              160  ABDUCTION          120*         3+                 130 *              160  Extension                60            4                      60                 60  ER                              60            4                     65                 85   IR                               L3             4                      50                 T12   Scapular strength at 3/5 . PROBLEM LIST (Impacting functional limitations):  1. Decreased Strength  2. Decreased ADL/Functional Activities  3.  Decreased Activity Tolerance  4. Decreased Work Simplification/Energy Conservation Techniques  5. Decreased Flexibility/Joint Mobility  6. Decreased [place] with Home Exercise Program    INTERVENTIONS PLANNED :   1. Manual Therapy   2. Therapeutic Exercise   3. Modalities Cold and Hot    TREATMENT PLAN:  Effective Dates: 10/23/2017 TO 1/23/2018    Frequency/Duration: up to 3 times a week for 12 weeks for right shoulder and right hip dysfunction. GOALS: (Goals have been discussed and agreed upon with patient.)  Rehabilitation Potential For Stated Goals: Good  Time Frame: 6 weeks   1. Decrease pain below 3/10 for the right shoulder with beginning home ADL's / shoulder mobility. 2. Decrease DASH to 25 in the right shoulder to indicate functional improvement and to demonstrate improved range of motion and functional improvement of the shoulder. 3. Patient to be independent with an initial HEP for the right shoulder for mobility and strengthening. DISCHARGE GOALS:   12 weeks       1. Decrease pain below 1/10 for the left shoulder and right hip with home ADL's / shoulder/hip  mobility. 2. Decrease DASH to 15 in the right shoulder to indicate functional improvement and to demonstrate improved strength and mobility of the right shoulder    3. Patient to be independent with an advanced HEP for the right shoulder and right hip for mobility and strengthening. Regarding [employer] therapy, I certify that the treatment plan above will be carried out by a therapist or under their direction. Thank you for this referral,  Mark Lozano PT     Referring Physician Signature: Valentina Awad MD              Date                    The information in this section was collected on 10/23/2017 (except where otherwise noted).     HISTORY:   History of Present Injury/Illness (Reason for Referral):  Patient is describing pain in the right shoulder with impingement type elevation especially with initiation of abduction . Scapular weakness is present and additional history of frozen shoulder. She is reporting flare up of this pain for the past three months. She is also reporting pain in the right hip greater trochanter region. Past Medical History/Comorbidities:   Ms. Diana Kovacs  has a past medical history of Anxiety; Nausea & vomiting; and Panic attacks. She also has no past medical history of Aneurysm ([address] Arrhythmia; Arthritis; Asthma; Autoimmune disease ([address] CAD (coronary artery disease); Cancer ([address] Chronic kidney disease; Chronic pain; Coagulation defects; COPD; Diabetes ([address] Difficult intubation; GERD (gastroesophageal reflux disease); Heart failure ([address] Hypertension; Liver disease; Malignant hyperthermia due to anesthesia; Morbid obesity ([address] Other ill-defined conditions(799.89); Pseudocholinesterase deficiency; Psychiatric disorder; PUD (peptic ulcer disease); Seizures ([facility] Stroke [facility]); Thromboembolus ([facility] [address] [facility] Thyroid disease; Unspecified adverse effect of anesthesia; or Unspecified sleep apnea. Ms. Diana Kovacs  has a past surgical history that includes tonsillectomy and orthopaedic (Right). Social History/Living Environment:     No limitations. Prior Level of Function/Work/Activity:  Patient had been independent with all functional mobility. Reports prior decrease in shoulder ROM secondary to prior frozen shoulder. Dominant Side:         RIGHTPersonal Factors:          Sex:  female        Age:  49 y.o. Current Medications:       Current Outpatient Prescriptions:     estradiol (ESTRACE) 1 mg tablet, Take 1 Tab by mouth daily. , Disp: 90 Tab, Rfl: 3    medroxyPROGESTERone (PROVERA) 2.5 mg tablet, Take 1 Tab by mouth daily. , Disp: 90 Tab, Rfl: 3    citalopram (CELEXA) 40 mg tablet, Take 40 mg by mouth nightly.  , Disp: , Rfl:    Date Last Reviewed:  11/19/2017     Number of Personal Factors/Comorbidities that affect the Plan of Care: 1-2: MODERATE COMPLEXITY   EXAMINATION:   Right  Hip abduction at 3/5 and palpably tender right greater trochanter region.   (+) OBERS   Right Shoulder                              AROM   STRENGTH   PROM  NORMALS  Flexion                    140*        3+                 150*              160  ABDUCTION          120*         3+                130 *              160  Extension               60            4                  60                 60  ER                             60            4                 65                 85   IR                              L3            4                  50               T12  *indicates antalgic mobility     Strength:See Above    Palpation :    Palpably tender right supraspinatus and right greater trochanter region. Special Tests:     (+) OBERS   Neurological Screen:    Myotomes: intact. Dermatomes: intact bilateral UE's light touch and proprioception. Reflexes: normal          Functional Mobility: intact  Balance: intact           Body Structures Involved:  1. Metabolic  2. Bones  3. Joints  4. Muscles  5. Ligaments Body Functions Affected:  1. Sensory/Pain  2. Neuromusculoskeletal  3. Movement Related  4. Skin Related  5. Metobolic/Endocrine Activities and Participation Affected:  1. Learning and Applying Knowledge  2. General Tasks and Demands  3. Mobility  4. Interpersonal Interactions and Relationships  5. Community, Social and [place]   Number of elements (examined above) that affect the Plan of Care: 3: MODERATE COMPLEXITY   CLINICAL PRESENTATION:   Presentation: Evolving clinical presentation with changing clinical characteristics: MODERATE COMPLEXITY   CLINICAL DECISION MAKING:   Outcome Measure: Tool Used: Disabilities of the Arm, Shoulder and Hand (DASH) Questionnaire - Quick Version  Score:  Initial: 26/55  Most Recent: X/55 (Date: -- )   Interpretation of Score: The DASH is designed to measure the activities of daily living in person's with upper extremity dysfunction or pain. Each section is scored on a 1-5 scale, 5 representing the greatest disability. The scores of each section are added together for a total score of 55. Score 11 12-19 20-28 29-37 38-45 46-54 55   Modifier  CI CJ CK CL CM CN     ? Changing and Maintaining Body Position:     - CURRENT STATUS: CJ - 20%-39% impaired, limited or restricted    - GOAL STATUS: CI - 1%-19% impaired, limited or restricted    - D/C STATUS:  ---------------To be determined---------------       Lower Extremity Functional Scale (LEFS)  Score:  Initial: 66/80 Most Recent: X/80 (Date: -- )   Interpretation of Score: 20 questions each scored on a 5 point scale with 0 representing \"extreme difficulty or unable to perform\" and 4 representing \"no difficulty\". The lower the score, the greater the functional disability. 80/80 represents no disability. Minimal detectable change is 9 points. Score 80 79-63 62-48 47-32 31-16 15-1 0   Modifier [facility] CJ CK CL CM CN         Medical Necessity:   · Patient demonstrates good rehab potential due to higher previous functional level. Reason for Services/Other Comments:  · Patient continues to require skilled intervention due to medical complications, patient unable to attend/participate in therapy as expected and right shoulder impingement and pain with elevation. .   Use of outcome tool(s) and clinical judgement create a POC that gives a: Questionable prediction of patient's progress: MODERATE COMPLEXITY            TREATMENT:   (In addition to Assessment/Re-Assessment sessions the following treatments were rendered)  Pre-treatment Symptoms/Complaints:  Right shoulder at 4/10 with elevation and abduction   Pain: Initial:     4/10 in the right shoulder , 1/10 in the right greater trochanter region  Post Session:  3/10 with elevation in the right shoulder, 1/10 right greater trochanter region.         THERAPEUTIC EXERCISE: (30 minutes):  Exercises per grid below to improve mobility, strength, balance and dynamic movement of shoulder - right to improve functional lifting, carrying, reaching and catching. Required minimal visual, verbal, manual and tactile cues to promote proper body alignment. Progressed repetitions as indicated. Date:  11/15/2017 Date:   Date:     Activity/Exercise Parameters Parameters Parameters   Upper trap shrugs  15 x's      Scapular clocks  15 x's      IT band stretch right side  3 x's      Postural reeducation  5 mins      UBE  3.0 3 mins forward and 3 mins backward      Against wall [place]  15 x's      Middle trap  15 x's      Lower Trap  15 x's      MANUAL THERAPY: (10 mins  minutes): Joint mobilization, Soft tissue mobilization and Manipulation was utilized and necessary because of the patient's restricted joint motion and restricted motion of soft tissue. Seated effleurage and petrissage and digital accupressure to the scapular region and rhomboids for trigger point release. And also grade II-III glide in the shoulder inferior   Modalities for pain control 5 min :   Right shoulder and scapular region with layered moist hot pack and increase blood flow. [facility]  Treatment/Session Assessment:    · Response to Treatment: No specific pain complaints post treatment. Patient demonstrates improving postural control and decreased strength in the scapular musculature. · Compliance with Program/Exercises: initial postural strengthening and stabilization. · Recommendations/Intent for next treatment session: \"Next visit will focus on advancements to more challenging activities, reduction in assistance provided and right shoulder mobility and scapular strengthening to decrease impingement in the right shoulder. \".   Total Treatment Duration:  PT Patient Time In/Time Out  Time In: 1345  Time Out: [address], [address] [facility]

## 2017-11-27 ENCOUNTER — HOSPITAL ENCOUNTER (OUTPATIENT)
Dept: PHYSICAL THERAPY | Age: 57
Discharge: HOME | End: 2017-11-27
Payer: COMMERCIAL

## 2017-11-27 PROCEDURE — 97140 MANUAL THERAPY 1/> REGIONS: CPT

## 2017-11-27 PROCEDURE — 97110 THERAPEUTIC EXERCISES: CPT

## 2017-11-27 NOTE — PROGRESS NOTES
India Phillips  : 1960  Payor: Lindsey Master / Plan: Andres Li PPO / Product Type: PPO /  [address]  at [facility]  [address], [address] [place] [address]  [phone]   [phone]       OUTPATIENT PHYSICAL THERAPY:Daily Note 2017    ICD-10: Treatment Diagnosis:  M75.51  Bursitis of right shoulder   M70.61  Trochanteric bursitis, right hip  Precautions/Allergies:   Review of patient's allergies indicates no known allergies. Fall Risk Score: 0 (? 5 = High Risk)  MD Orders: PT EVAL AND TREATMENT BURSITIS RIGHT SHOULDER and RIGHT HIP BURSITIS  MEDICAL/REFERRING DIAGNOSIS:  Bursitis of right shoulder [M75.51]  Trochanteric bursitis, right hip [M70.61]   DATE OF ONSET: 2017   REFERRING PHYSICIAN: Karen Haro MD  RETURN PHYSICIAN APPOINTMENT:  Pending      INITIAL ASSESSMENT:    India Phillips is a  pleasant middle aged female with poor scapular posture and weakness. There is point tenderness in the right supraspinatus region, also in the right greater trochanter region. She demonstrates pain with upper elevation and (+) IT band test. Skilled PT is indicated for this patient's right shoulder for strengthening and mobility. Right                              AROM   STRENGTH   PROM  NORMALS  Flexion                    140*         3+                 150*              160  ABDUCTION          120*         3+                 130 *              160  Extension                60            4                      60                 60  ER                              60            4                     65                 85   IR                               L3             4                      50                 T12   Scapular strength at 3/5 . PROBLEM LIST (Impacting functional limitations):  1. Decreased Strength  2. Decreased ADL/Functional Activities  3.  Decreased Activity Tolerance  4. Decreased Work Simplification/Energy Conservation Techniques  5. Decreased Flexibility/Joint Mobility  6. Decreased [place] with Home Exercise Program    INTERVENTIONS PLANNED :   1. Manual Therapy   2. Therapeutic Exercise   3. Modalities Cold and Hot    TREATMENT PLAN:  Effective Dates: 10/23/2017 TO 1/23/2018    Frequency/Duration: up to 3 times a week for 12 weeks for right shoulder and right hip dysfunction. GOALS: (Goals have been discussed and agreed upon with patient.)  Rehabilitation Potential For Stated Goals: Good  Time Frame: 6 weeks   1. Decrease pain below 3/10 for the right shoulder with beginning home ADL's / shoulder mobility. 2. Decrease DASH to 25 in the right shoulder to indicate functional improvement and to demonstrate improved range of motion and functional improvement of the shoulder. 3. Patient to be independent with an initial HEP for the right shoulder for mobility and strengthening. DISCHARGE GOALS:   12 weeks       1. Decrease pain below 1/10 for the left shoulder and right hip with home ADL's / shoulder/hip  mobility. 2. Decrease DASH to 15 in the right shoulder to indicate functional improvement and to demonstrate improved strength and mobility of the right shoulder    3. Patient to be independent with an advanced HEP for the right shoulder and right hip for mobility and strengthening. Regarding [employer] therapy, I certify that the treatment plan above will be carried out by a therapist or under their direction. Thank you for this referral,  Ann Guan PT     Referring Physician Signature: Robel Acevedo MD              Date                    The information in this section was collected on 10/23/2017 (except where otherwise noted).     HISTORY:   History of Present Injury/Illness (Reason for Referral):  Patient is describing pain in the right shoulder with impingement type elevation especially with initiation of abduction . Scapular weakness is present and additional history of frozen shoulder. She is reporting flare up of this pain for the past three months. She is also reporting pain in the right hip greater trochanter region. Past Medical History/Comorbidities:   Ms. Nadia Pittman  has a past medical history of Anxiety; Nausea & vomiting; and Panic attacks. She also has no past medical history of Aneurysm ([address] Arrhythmia; Arthritis; Asthma; Autoimmune disease ([address] CAD (coronary artery disease); Cancer ([address] Chronic kidney disease; Chronic pain; Coagulation defects; COPD; Diabetes ([address] Difficult intubation; GERD (gastroesophageal reflux disease); Heart failure ([address] Hypertension; Liver disease; Malignant hyperthermia due to anesthesia; Morbid obesity ([address] Other ill-defined conditions(799.89); Pseudocholinesterase deficiency; Psychiatric disorder; PUD (peptic ulcer disease); Seizures ([facility] [address] Stroke [facility]); Thromboembolus ([facility] [address] Thyroid disease; Unspecified adverse effect of anesthesia; or Unspecified sleep apnea. Ms. Nadia Pittman  has a past surgical history that includes tonsillectomy and orthopaedic (Right). Social History/Living Environment:     No limitations. Prior Level of Function/Work/Activity:  Patient had been independent with all functional mobility. Reports prior decrease in shoulder ROM secondary to prior frozen shoulder. Dominant Side:         RIGHTPersonal Factors:          Sex:  female        Age:  49 y.o. Current Medications:       Current Outpatient Prescriptions:     estradiol (ESTRACE) 1 mg tablet, Take 1 Tab by mouth daily. , Disp: 90 Tab, Rfl: 3    medroxyPROGESTERone (PROVERA) 2.5 mg tablet, Take 1 Tab by mouth daily. , Disp: 90 Tab, Rfl: 3    citalopram (CELEXA) 40 mg tablet, Take 40 mg by mouth nightly.  , Disp: , Rfl:    Date Last Reviewed:  11/28/2017     Number of Personal Factors/Comorbidities that affect the Plan of Care: 1-2: MODERATE COMPLEXITY   EXAMINATION:   Right  Hip abduction at 3/5 and palpably tender right greater trochanter region.   (+) OBERS   Right Shoulder                              AROM   STRENGTH   PROM  NORMALS  Flexion                    140*        3+                 150*              160  ABDUCTION          120*         3+                130 *              160  Extension               60            4                  60                 60  ER                             60            4                 65                 85   IR                              L3            4                  50               T12  *indicates antalgic mobility     Strength:See Above    Palpation :    Palpably tender right supraspinatus and right greater trochanter region. Special Tests:     (+) OBERS   Neurological Screen:    Myotomes: intact. Dermatomes: intact bilateral UE's light touch and proprioception. Reflexes: normal          Functional Mobility: intact  Balance: intact           Body Structures Involved:  1. Metabolic  2. Bones  3. Joints  4. Muscles  5. Ligaments Body Functions Affected:  1. Sensory/Pain  2. Neuromusculoskeletal  3. Movement Related  4. Skin Related  5. Metobolic/Endocrine Activities and Participation Affected:  1. Learning and Applying Knowledge  2. General Tasks and Demands  3. Mobility  4. Interpersonal Interactions and Relationships  5. Community, Social and [place]   Number of elements (examined above) that affect the Plan of Care: 3: MODERATE COMPLEXITY   CLINICAL PRESENTATION:   Presentation: Evolving clinical presentation with changing clinical characteristics: MODERATE COMPLEXITY   CLINICAL DECISION MAKING:   Outcome Measure: Tool Used: Disabilities of the Arm, Shoulder and Hand (DASH) Questionnaire - Quick Version  Score:  Initial: 26/55  Most Recent: X/55 (Date: -- )   Interpretation of Score: The DASH is designed to measure the activities of daily living in person's with upper extremity dysfunction or pain. Each section is scored on a 1-5 scale, 5 representing the greatest disability. The scores of each section are added together for a total score of 55. Score 11 12-19 20-28 29-37 38-45 46-54 55   Modifier  CI CJ CK CL CM CN     ? Changing and Maintaining Body Position:     - CURRENT STATUS: CJ - 20%-39% impaired, limited or restricted    - GOAL STATUS: CI - 1%-19% impaired, limited or restricted    - D/C STATUS:  ---------------To be determined---------------       Lower Extremity Functional Scale (LEFS)  Score:  Initial: 66/80 Most Recent: X/80 (Date: -- )   Interpretation of Score: 20 questions each scored on a 5 point scale with 0 representing \"extreme difficulty or unable to perform\" and 4 representing \"no difficulty\". The lower the score, the greater the functional disability. 80/80 represents no disability. Minimal detectable change is 9 points. Score 80 79-63 62-48 47-32 31-16 15-1 0   Modifier [facility] CJ CK CL CM CN         Medical Necessity:   · Patient demonstrates good rehab potential due to higher previous functional level. Reason for Services/Other Comments:  · Patient continues to require skilled intervention due to medical complications, patient unable to attend/participate in therapy as expected and right shoulder impingement and pain with elevation. .   Use of outcome tool(s) and clinical judgement create a POC that gives a: Questionable prediction of patient's progress: MODERATE COMPLEXITY            TREATMENT:   (In addition to Assessment/Re-Assessment sessions the following treatments were rendered)  Pre-treatment Symptoms/Complaints:  Right shoulder at 4/10 with elevation and abduction with exquisite tenderness in the right deltoid bursa region. Pain: Initial:     4/10 in the right shoulder , 1/10 in the right greater trochanter region  Post Session:  3/10 with elevation in the right shoulder, 1/10 right greater trochanter region.         THERAPEUTIC EXERCISE: (30 minutes):  Exercises per grid below to improve mobility, strength, balance and dynamic movement of shoulder - right to improve functional lifting, carrying, reaching and catching. Required minimal visual, verbal, manual and tactile cues to promote proper body alignment. Progressed repetitions as indicated. Date:  11/27/2017 Date:   Date:     Activity/Exercise Parameters Parameters Parameters   Upper trap shrugs  15 x's      Scapular clocks  15 x's      IT band stretch right side  3 x's      Postural reeducation  5 mins      UBE  3.0 3 mins forward and 3 mins backward      Against wall [place]  15 x's      Middle trap  15 x's      Lower Trap  15 x's      MANUAL THERAPY: (10 mins  minutes): Joint mobilization, Soft tissue mobilization and Manipulation was utilized and necessary because of the patient's restricted joint motion and restricted motion of soft tissue. Seated effleurage and petrissage and digital accupressure to the scapular region and rhomboids for trigger point release. And also grade II-III glide in the shoulder inferior   Modalities for pain control 10 min :   Right shoulder and scapular region with cold pack to decrease inflammation. [employer] Portal  Treatment/Session Assessment:    · Response to Treatment: No specific pain complaints post treatment. Patient demonstrates improving postural control and decreased strength in the scapular musculature. · Compliance with Program/Exercises: initial postural strengthening and stabilization. · Recommendations/Intent for next treatment session: \"Next visit will focus on advancements to more challenging activities, reduction in assistance provided and right shoulder mobility and scapular strengthening to decrease impingement in the right shoulder. \".   Total Treatment Duration:  PT Patient Time In/Time Out  Time In: 1600  Time Out: [address], [place]

## 2017-12-04 ENCOUNTER — APPOINTMENT (OUTPATIENT)
Dept: PHYSICAL THERAPY | Age: 57
End: 2017-12-04

## 2018-01-18 ENCOUNTER — HOSPITAL ENCOUNTER (OUTPATIENT)
Dept: PHYSICAL THERAPY | Age: 58
Discharge: HOME | End: 2018-01-18
Payer: COMMERCIAL

## 2018-01-18 PROCEDURE — 97110 THERAPEUTIC EXERCISES: CPT

## 2018-01-18 PROCEDURE — 97140 MANUAL THERAPY 1/> REGIONS: CPT

## 2018-01-18 PROCEDURE — 97162 PT EVAL MOD COMPLEX 30 MIN: CPT

## 2018-01-18 NOTE — THERAPY EVALUATION
Felisha Villanueva  : 1960  Payor: Funmilayo Dawson / Plan: Ronny Velázquez PPO / Product Type: PPO /  [address]  at [facility]  April [birth year], [facility], [address]  [phone]   [phone]       OUTPATIENT PHYSICAL THERAPY:Initial Assessment and Treatment Day: Day of Assessment 2018    ICD-10: Treatment Diagnosis:  M75.51  Bursitis of right shoulder   M70.61  Trochanteric bursitis, right hip  Precautions/Allergies:   Review of patient's allergies indicates no known allergies. Fall Risk Score: 0 (? 5 = High Risk)  MD Orders: PT EVAL AND TREATMENT BURSITIS RIGHT SHOULDER and RIGHT HIP BURSITIS  MEDICAL/REFERRING DIAGNOSIS:  Bursitis of right shoulder [M75.51]  Complete rotator cuff tear or rupture of left shoulder, not specified as traumatic [M75.122]   Left Shoulder adhesive capsulitis   DATE OF ONSET: 2017   REFERRING PHYSICIAN: Joaquina Dickey MD  RETURN PHYSICIAN APPOINTMENT:  Pending      INITIAL ASSESSMENT:    Felisha Villanueva is a  pleasant middle aged female with poor scapular posture and weakness. There is point tenderness in the left anterior shoulder region, the trochanter pain and right shoulder pain have diminished and are improved. She demonstrates pain with elevation left UE with restricted capsular external rotation pattern. Skilled PT is indicated for this patient's left shoulder for strengthening and mobility.                                                                     LEFT                              AROM   STRENGTH   PROM  NORMALS  Flexion                    80*          2+                85*              160  ABDUCTION          70*          2+                 75 *             160  Extension                60           4                  60                 60  ER                              60           4                  65                 85   IR                gluteal fold          2+            Gluteal fold    T12 Scapular strength at 3/5 . PROBLEM LIST (Impacting functional limitations):  1. Decreased Strength  2. Decreased ADL/Functional Activities  3. Decreased Activity Tolerance  4. Decreased Work Simplification/Energy Conservation Techniques  5. Decreased Flexibility/Joint Mobility  6. Decreased [place] with Home Exercise Program    INTERVENTIONS PLANNED :   1. Manual Therapy   2. Therapeutic Exercise   3. Modalities Cold and Hot               4.         Ultrasound    TREATMENT PLAN:  Effective Dates: 1/18/2018 TO 4/18/2018    Frequency/Duration: up to 3 times a week for 12 weeks for left shoulder    GOALS: (Goals have been discussed and agreed upon with patient.)  Rehabilitation Potential For Stated Goals: Good  Time Frame: 6 weeks   1. Decrease pain below 3/10 for the left shoulder with beginning home ADL's / shoulder mobility. 2. Decrease DASH to 20 in the left shoulder to indicate functional improvement and to demonstrate improved range of motion and functional improvement of the shoulder. 3. Patient to be independent with an initial HEP for the left shoulder for mobility and strengthening. DISCHARGE GOALS:   12 weeks       1. Decrease pain below 1/10 for the left shoulder and right hip with home ADL's / shoulder/hip  mobility. 2. Decrease DASH to 15 in the right shoulder to indicate functional improvement and to demonstrate improved strength and mobility of the left shoulder    3. Patient to be independent with an advanced HEP for the left shoulder for mobility and strengthening. Regarding [employer] therapy, I certify that the treatment plan above will be carried out by a therapist or under their direction. Thank you for this referral,  Cindy Orozco PT     Referring Physician Signature: Chanel Chavez MD              Date                    The information in this section was collected on 10/23/2017 (except where otherwise noted).     HISTORY:   History of Present Injury/Illness (Reason for Referral):  Patient is describing pain in the left shoulder with decreased elevation especially with initiation of abduction and capsular pattern. 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 She is reporting flare up of this pain for the past three months following the same pattern in the left shoulder. Past Medical History/Comorbidities:   Ms. Kavon Orozco  has a past medical history of Anxiety; Nausea & vomiting; and Panic attacks. She also has no past medical history of Aneurysm ([address] Arrhythmia; Arthritis; Asthma; Autoimmune disease ([address] CAD (coronary artery disease); Cancer ([address] Chronic kidney disease; Chronic pain; Coagulation defects; COPD; Diabetes ([address] Difficult intubation; GERD (gastroesophageal reflux disease); Heart failure ([address] Hypertension; Liver disease; Malignant hyperthermia due to anesthesia; Morbid obesity ([address] Other ill-defined conditions(799.89); Pseudocholinesterase deficiency; Psychiatric disorder; PUD (peptic ulcer disease); Seizures ([address] Stroke [facility]); Thromboembolus ([address] Thyroid disease; Unspecified adverse effect of anesthesia; or Unspecified sleep apnea. Ms. Kavon Orozco  has a past surgical history that includes hx tonsillectomy and hx orthopaedic (Right). Social History/Living Environment:     No limitations. Prior Level of Function/Work/Activity:  Patient had been independent with all functional mobility. Reports prior decrease in shoulder ROM secondary to prior frozen shoulder left. Dominant Side:         RIGHT   Personal Factors:          Sex:  female        Age:  56 y.o. Current Medications:       Current Outpatient Prescriptions:     estradiol (ESTRACE) 1 mg tablet, Take 1 Tab by mouth daily. , Disp: 90 Tab, Rfl: 3    medroxyPROGESTERone (PROVERA) 2.5 mg tablet, Take 1 Tab by mouth daily. , Disp: 90 Tab, Rfl: 3    citalopram (CELEXA) 40 mg tablet, Take 40 mg by mouth nightly.   , Disp: , Rfl:    Date Last Reviewed:  1/22/2018   Number of Personal Factors/Comorbidities that affect the Plan of Care: 1-2: MODERATE COMPLEXITY   EXAMINATION:     LEFT Shoulder                                    LEFT                              AROM   STRENGTH   PROM  NORMALS  Flexion                    80*          2+                85*              160  ABDUCTION          70*          2+                 75 *             160  Extension                60           4                  60                 60  ER                              60           4                  65                 85   IR                gluteal fold          2+            Gluteal fold    T12   Scapular strength at 3/5 . Strength:See Above    Palpation :    Palpably tender left anterior superior GHJ            Special Tests:     Painful and limited loss of GHJ range of motion for shoulder abduction and shoulder external rotation. Neurological Screen:    Myotomes: intact. Dermatomes: intact bilateral UE's light touch and proprioception. Reflexes: normal          Functional Mobility: intact  Balance: intact           Body Structures Involved:  1. Metabolic  2. Bones  3. Joints  4. Muscles  5. Ligaments Body Functions Affected:  1. Sensory/Pain  2. Neuromusculoskeletal  3. Movement Related  4. Skin Related  5. Metobolic/Endocrine Activities and Participation Affected:  1. Learning and Applying Knowledge  2. General Tasks and Demands  3. Mobility  4. Interpersonal Interactions and Relationships  5.  Community, Social and [place]   Number of elements (examined above) that affect the Plan of Care: 3: MODERATE COMPLEXITY   CLINICAL PRESENTATION:   Presentation: Evolving clinical presentation with changing clinical characteristics: MODERATE COMPLEXITY   CLINICAL DECISION MAKING:   Outcome Measure: 1/18/2018  Tool Used: Disabilities of the Arm, Shoulder and Hand (DASH) Questionnaire - Quick Version  Score:  Initial: 25/55  Most Recent: X/55 (Date: -- )   Interpretation of Score: The DASH is designed to measure the activities of daily living in person's with upper extremity dysfunction or pain. Each section is scored on a 1-5 scale, 5 representing the greatest disability. The scores of each section are added together for a total score of 55. Score 11 12-19 20-28 29-37 38-45 46-54 55   Modifier CH CI CJ CK CL CM CN     ? Changing and Maintaining Body Position:     - CURRENT STATUS: CJ - 20%-39% impaired, limited or restricted    - GOAL STATUS: CI - 1%-19% impaired, limited or restricted    - D/C STATUS:  ---------------To be determined---------------       Lower Extremity Functional Scale (LEFS)  Score:  Initial: 66/80 Most Recent: X/80 (Date: -- )   Interpretation of Score: 20 questions each scored on a 5 point scale with 0 representing \"extreme difficulty or unable to perform\" and 4 representing \"no difficulty\". The lower the score, the greater the functional disability. 80/80 represents no disability. Minimal detectable change is 9 points. Score 80 79-63 62-48 47-32 31-16 15-1 0   Modifier CH CI CJ CK CL CM CN         Medical Necessity:   · Patient demonstrates good rehab potential due to higher previous functional level. Reason for Services/Other Comments:  · Patient continues to require skilled intervention due to medical complications, patient unable to attend/participate in therapy as expected and left shoulder impingement and pain with elevation. .   Use of outcome tool(s) and clinical judgement create a POC that gives a: Questionable prediction of patient's progress: MODERATE COMPLEXITY            TREATMENT:   (In addition to Assessment/Re-Assessment sessions the following treatments were rendered)  Pre-treatment Symptoms/Complaints:  Left shoulder at 7/10 with elevation and abduction   Pain: Initial:   7/10 in the left shoulder.      Post Session:  5/10 with elevation in the left shoulder        THERAPEUTIC EXERCISE: (15 minutes):  Exercises per grid below to improve mobility, strength, balance and dynamic movement of shoulder - left to improve functional lifting, carrying, reaching and catching. Required minimal visual, verbal, manual and tactile cues to promote proper body alignment. Progressed repetitions as indicated. Date:  1/18/2018 Date:   Date:     Activity/Exercise Parameters Parameters Parameters   Upper trap shrugs  10 x's      Scapular clocks  15 x's      Pulleys flexion/abduction  25 x's      UBE clockwise and counterclockwise  6 mins 3 forward and backward. Manual therapy :    10 mins :  Trigger point digital accupressure to the left shoulder with sustained hold and effleurage and petrissage post 5 mins of hot pack to the left shoulder and scapular region. Med[employer] Portal  Treatment/Session Assessment:    · Response to Treatment: No specific pain complaints post evaluation and treatment. · Compliance with Program/Exercises: Will assess as treatment progresses. · Recommendations/Intent for next treatment session: \"Next visit will focus on advancements to more challenging activities, reduction in assistance provided and left shoulder mobility and scapular strengthening to decrease impingement in the right shoulder. \".   Total Treatment Duration:  PT Patient Time In/Time Out  Time In: 1600  Time Out: 243 [facility]

## 2018-01-18 NOTE — THERAPY DISCHARGE
Julianne Rey has been seen in physical therapy from 10/23/2017  to 11/28/2017 for 7 visits. Treatment has been discontinued at this time due to return to physician for MRI. Ca Moore The below goals were met prior to discontinuation. Some goals were not met due to continued pain and dysfunction. Thank you for this referral.  Martin Wilson, PT, DPT, OCS.

## 2018-01-22 ENCOUNTER — HOSPITAL ENCOUNTER (OUTPATIENT)
Dept: PHYSICAL THERAPY | Age: 58
Discharge: HOME | End: 2018-01-22
Payer: COMMERCIAL

## 2018-01-22 PROCEDURE — 97110 THERAPEUTIC EXERCISES: CPT

## 2018-01-22 PROCEDURE — 97140 MANUAL THERAPY 1/> REGIONS: CPT

## 2018-01-22 NOTE — PROGRESS NOTES
Ambulatory/Rehab Services H2 Model Falls Risk Assessment    Risk Factor Pts. ·   Confusion/Disorientation/Impulsivity  []    4 ·   Symptomatic Depression  []   2 ·   Altered Elimination  []   1 ·   Dizziness/Vertigo  []   1 ·   Gender (Male)  []   1 ·   Any administered antiepileptics (anticonvulsants):  []   2 ·   Any administered benzodiazepines:  []   1 ·   Visual Impairment (specify):  []   1 ·   Portable Oxygen Use  []   1 ·   Orthostatic ? BP  []   1 ·   History of Recent Falls (within 3 mos.)  []   5     Ability to Rise from Chair (choose one) Pts. ·   Ability to rise in a single movement  []   0 ·   Pushes up, successful in one attempt  []   1 ·   Multiple attempts, but successful  []   3 ·   Unable to rise without assistance  []   4   Total: (5 or greater = High Risk) 0     Falls Prevention Plan:   []                Physical Limitations to Exercise (specify):   []                Mobility Assistance Device (type):   []                Exercise/Equipment Adaptation (specify):    ©2010 [facility] [place] Patent #[id number].  Federal Law prohibits the replication, distribution or use without written permission from [facility] [employer]

## 2018-01-23 NOTE — PROGRESS NOTES
Venkatesh Love  : 1960  Payor: Conrado Christensen / Plan: Geri Bodily PPO / Product Type: PPO /  [id number]  at [facility] [facility]", [place] [address] [address] [facility]  [phone]   [phone]       OUTPATIENT PHYSICAL THERAPY:Daily Note 2018    ICD-10: Treatment Diagnosis:  M75.51  Bursitis of right shoulder   M70.61  Trochanteric bursitis, right hip  Precautions/Allergies:   Review of patient's allergies indicates no known allergies. Fall Risk Score: 0 (? 5 = High Risk)  MD Orders: PT EVAL AND TREATMENT BURSITIS RIGHT SHOULDER and RIGHT HIP BURSITIS  MEDICAL/REFERRING DIAGNOSIS:  Complete rotator cuff tear or rupture of left shoulder, not specified as traumatic [M75.122]  Bursitis of right shoulder [M75.51]   Left Shoulder adhesive capsulitis   DATE OF ONSET: 2017   REFERRING PHYSICIAN: Jayy Kaur MD  RETURN PHYSICIAN APPOINTMENT:  Pending      INITIAL ASSESSMENT:    Venkatesh Love is a  pleasant middle aged female with poor scapular posture and weakness. There is point tenderness in the left anterior shoulder region, the trochanter pain and right shoulder pain have diminished and are improved. She demonstrates pain with elevation left UE with restricted capsular external rotation pattern. Skilled PT is indicated for this patient's left shoulder for strengthening and mobility. LEFT                              AROM   STRENGTH   PROM  NORMALS  Flexion                    80*          2+                85*              160  ABDUCTION          70*          2+                 75 *             160  Extension                60           4                  60                 60  ER                              60           4                  65                 85   IR                gluteal fold          2+            Gluteal fold    T12   Scapular strength at 3/5 .       PROBLEM LIST (Impacting functional limitations):  1. Decreased Strength  2. Decreased ADL/Functional Activities  3. Decreased Activity Tolerance  4. Decreased Work Simplification/Energy Conservation Techniques  5. Decreased Flexibility/Joint Mobility  6. Decreased [place] with Home Exercise Program    INTERVENTIONS PLANNED :   1. Manual Therapy   2. Therapeutic Exercise   3. Modalities Cold and Hot               4.         Ultrasound    TREATMENT PLAN:  Effective Dates: 1/18/2018 TO 4/18/2018    Frequency/Duration: up to 3 times a week for 12 weeks for left shoulder    GOALS: (Goals have been discussed and agreed upon with patient.)  Rehabilitation Potential For Stated Goals: Good  Time Frame: 6 weeks   1. Decrease pain below 3/10 for the left shoulder with beginning home ADL's / shoulder mobility. 2. Decrease DASH to 20 in the left shoulder to indicate functional improvement and to demonstrate improved range of motion and functional improvement of the shoulder. 3. Patient to be independent with an initial HEP for the left shoulder for mobility and strengthening. DISCHARGE GOALS:   12 weeks       1. Decrease pain below 1/10 for the left shoulder and right hip with home ADL's / shoulder/hip  mobility. 2. Decrease DASH to 15 in the right shoulder to indicate functional improvement and to demonstrate improved strength and mobility of the left shoulder    3. Patient to be independent with an advanced HEP for the left shoulder for mobility and strengthening. Regarding [employer] therapy, I certify that the treatment plan above will be carried out by a therapist or under their direction. Thank you for this referral,  Juanita Boss PT     Referring Physician Signature: Guillermina Ivy MD              Date                    The information in this section was collected on 10/23/2017 (except where otherwise noted).     HISTORY:   History of Present Injury/Illness (Reason for Referral):  Patient is describing pain in the left shoulder with decreased elevation especially with initiation of abduction and capsular pattern. 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 She is reporting flare up of this pain for the past three months following the same pattern in the left shoulder. Past Medical History/Comorbidities:   Ms. Toshia Mcfadden  has a past medical history of Anxiety; Nausea & vomiting; and Panic attacks. She also has no past medical history of Aneurysm ([address] Arrhythmia; Arthritis; Asthma; Autoimmune disease ([address] CAD (coronary artery disease); Cancer ([address] Chronic kidney disease; Chronic pain; Coagulation defects; COPD; Diabetes ([address] Difficult intubation; GERD (gastroesophageal reflux disease); Heart failure ([address] Hypertension; Liver disease; Malignant hyperthermia due to anesthesia; Morbid obesity ([address] Other ill-defined conditions(799.89); Pseudocholinesterase deficiency; Psychiatric disorder; PUD (peptic ulcer disease); Seizures ([facility] [address] Stroke [facility]); Thromboembolus ([address] Thyroid disease; Unspecified adverse effect of anesthesia; or Unspecified sleep apnea. Ms. Toshia Mcfadden  has a past surgical history that includes hx tonsillectomy and hx orthopaedic (Right). Social History/Living Environment:     No limitations. Prior Level of Function/Work/Activity:  Patient had been independent with all functional mobility. Reports prior decrease in shoulder ROM secondary to prior frozen shoulder left. Dominant Side:         RIGHT   Personal Factors:          Sex:  female        Age:  56 y.o. Current Medications:       Current Outpatient Prescriptions:     estradiol (ESTRACE) 1 mg tablet, Take 1 Tab by mouth daily. , Disp: 90 Tab, Rfl: 3    medroxyPROGESTERone (PROVERA) 2.5 mg tablet, Take 1 Tab by mouth daily. , Disp: 90 Tab, Rfl: 3    citalopram (CELEXA) 40 mg tablet, Take 40 mg by mouth nightly.  , Disp: , Rfl:    Date Last Reviewed: 1/23/2018   Number of Personal Factors/Comorbidities that affect the Plan of Care: 1-2: MODERATE COMPLEXITY   EXAMINATION:     LEFT Shoulder                                    LEFT                              AROM   STRENGTH   PROM  NORMALS  Flexion                    80*          2+                85*              160  ABDUCTION          70*          2+                 75 *             160  Extension                60           4                  60                 60  ER                              60           4                  65                 85   IR                gluteal fold          2+            Gluteal fold    T12   Scapular strength at 3/5 . Strength:See Above    Palpation :    Palpably tender left anterior superior GHJ            Special Tests:     Painful and limited loss of GHJ range of motion for shoulder abduction and shoulder external rotation. Neurological Screen:    Myotomes: intact. Dermatomes: intact bilateral UE's light touch and proprioception. Reflexes: normal          Functional Mobility: intact  Balance: intact           Body Structures Involved:  1. Metabolic  2. Bones  3. Joints  4. Muscles  5. Ligaments Body Functions Affected:  1. Sensory/Pain  2. Neuromusculoskeletal  3. Movement Related  4. Skin Related  5. Metobolic/Endocrine Activities and Participation Affected:  1. Learning and Applying Knowledge  2. General Tasks and Demands  3. Mobility  4. Interpersonal Interactions and Relationships  5.  Community, Social and [place]   Number of elements (examined above) that affect the Plan of Care: 3: MODERATE COMPLEXITY   CLINICAL PRESENTATION:   Presentation: Evolving clinical presentation with changing clinical characteristics: MODERATE COMPLEXITY   CLINICAL DECISION MAKING:   Outcome Measure: 1/18/2018  Tool Used: Disabilities of the Arm, Shoulder and Hand (DASH) Questionnaire - Quick Version  Score:  Initial: 25/55  Most Recent: X/55 (Date: -- )   Interpretation of Score: The DASH is designed to measure the activities of daily living in person's with upper extremity dysfunction or pain. Each section is scored on a 1-5 scale, 5 representing the greatest disability. The scores of each section are added together for a total score of 55. Score 11 12-19 20-28 29-37 38-45 46-54 55   Modifier CH CI CJ CK CL CM CN     ? Changing and Maintaining Body Position:     - CURRENT STATUS: CJ - 20%-39% impaired, limited or restricted    - GOAL STATUS: CI - 1%-19% impaired, limited or restricted    - D/C STATUS:  ---------------To be determined---------------       Lower Extremity Functional Scale (LEFS)  Score:  Initial: 66/80 Most Recent: X/80 (Date: -- )   Interpretation of Score: 20 questions each scored on a 5 point scale with 0 representing \"extreme difficulty or unable to perform\" and 4 representing \"no difficulty\". The lower the score, the greater the functional disability. 80/80 represents no disability. Minimal detectable change is 9 points. Score 80 79-63 62-48 47-32 31-16 15-1 0   Modifier  CI CJ CK CL CM CN         Medical Necessity:   · Patient demonstrates good rehab potential due to higher previous functional level. Reason for Services/Other Comments:  · Patient continues to require skilled intervention due to medical complications, patient unable to attend/participate in therapy as expected and left shoulder impingement and pain with elevation. .   Use of outcome tool(s) and clinical judgement create a POC that gives a: Questionable prediction of patient's progress: MODERATE COMPLEXITY            TREATMENT:   (In addition to Assessment/Re-Assessment sessions the following treatments were rendered)  Pre-treatment Symptoms/Complaints:  Left shoulder at 2/10 with elevation and abduction much improved   Pain: Initial:   2/10 in the left shoulder.      Post Session:  1/10 with elevation in the left shoulder THERAPEUTIC EXERCISE: (30 minutes):  Exercises per grid below to improve mobility, strength, balance and dynamic movement of shoulder - left to improve functional lifting, carrying, reaching and catching. Required minimal visual, verbal, manual and tactile cues to promote proper body alignment. Progressed repetitions as indicated. Date:  1/23/2018 Date:   Date:     Activity/Exercise Parameters Parameters Parameters   Upper trap shrugs  10 x's      Scapular clocks  15 x's      Pulleys flexion/abduction  25 x's      UBE clockwise and counterclockwise  6 mins 3 forward and backward. Wand flexion, abduction, ER/IR, scaption  20 x's      PNF pattern D1 and D2 flexion and abduction  10 x's              Manual therapy :    10 mins :  Seated Trigger point digital accupressure to the left shoulder with sustained hold and effleurage and petrissage post 5 mins of hot pack to the left shoulder and scapular region and grade III anterior inferior shoulder mobs. [employer] Portal  Treatment/Session Assessment:    · Response to Treatment: much improved overall today with all mobilization and increased shoulder ROM. · Compliance with Program/Exercises:   Good compliance. · Recommendations/Intent for next treatment session: \"Next visit will focus on advancements to more challenging activities, reduction in assistance provided and left shoulder mobility and scapular strengthening to decrease impingement in the right shoulder. \".   Total Treatment Duration:  PT Patient Time In/Time Out  Time In: 1600  Time Out: [place]

## 2018-01-25 ENCOUNTER — HOSPITAL ENCOUNTER (OUTPATIENT)
Dept: PHYSICAL THERAPY | Age: 58
Discharge: HOME | End: 2018-01-25
Payer: COMMERCIAL

## 2018-01-25 PROCEDURE — 97140 MANUAL THERAPY 1/> REGIONS: CPT

## 2018-01-25 PROCEDURE — 97110 THERAPEUTIC EXERCISES: CPT

## 2018-01-25 NOTE — PROGRESS NOTES
Josh Will  : 1960  Payor: Siobhan Landeros / Plan: Marc Goncalves PPO / Product Type: PPO /  [address] at [address], [place] [address] [address] [facility]  [phone]   [phone]       OUTPATIENT PHYSICAL THERAPY:Daily Note 2018    ICD-10: Treatment Diagnosis:  M75.51  Bursitis of right shoulder   M70.61  Trochanteric bursitis, right hip  Precautions/Allergies:   Review of patient's allergies indicates no known allergies. Fall Risk Score: 0 (? 5 = High Risk)  MD Orders: PT EVAL AND TREATMENT BURSITIS RIGHT SHOULDER and RIGHT HIP BURSITIS  MEDICAL/REFERRING DIAGNOSIS:  Complete rotator cuff tear or rupture of left shoulder, not specified as traumatic [M75.122]  Bursitis of right shoulder [M75.51]   Left Shoulder adhesive capsulitis   DATE OF ONSET: 2017   REFERRING PHYSICIAN: Honey Alston MD  RETURN PHYSICIAN APPOINTMENT:  Pending      INITIAL ASSESSMENT:    Josh Will is a  pleasant middle aged female with poor scapular posture and weakness. There is point tenderness in the left anterior shoulder region, the trochanter pain and right shoulder pain have diminished and are improved. She demonstrates pain with elevation left UE with restricted capsular external rotation pattern. Skilled PT is indicated for this patient's left shoulder for strengthening and mobility. LEFT                              AROM   STRENGTH   PROM  NORMALS  Flexion                    80*          2+                85*              160  ABDUCTION          70*          2+                 75 *             160  Extension                60           4                  60                 60  ER                              60           4                  65                 85   IR                gluteal fold          2+            Gluteal fold    T12   Scapular strength at 3/5 .       PROBLEM LIST (Impacting functional limitations):  1. Decreased Strength  2. Decreased ADL/Functional Activities  3. Decreased Activity Tolerance  4. Decreased Work Simplification/Energy Conservation Techniques  5. Decreased Flexibility/Joint Mobility  6. Decreased [place] with Home Exercise Program    INTERVENTIONS PLANNED :   1. Manual Therapy   2. Therapeutic Exercise   3. Modalities Cold and Hot               4.         Ultrasound    TREATMENT PLAN:  Effective Dates: 1/18/2018 TO 4/18/2018    Frequency/Duration: up to 3 times a week for 12 weeks for left shoulder    GOALS: (Goals have been discussed and agreed upon with patient.)  Rehabilitation Potential For Stated Goals: Good  Time Frame: 6 weeks   1. Decrease pain below 3/10 for the left shoulder with beginning home ADL's / shoulder mobility. 2. Decrease DASH to 20 in the left shoulder to indicate functional improvement and to demonstrate improved range of motion and functional improvement of the shoulder. 3. Patient to be independent with an initial HEP for the left shoulder for mobility and strengthening. DISCHARGE GOALS:   12 weeks       1. Decrease pain below 1/10 for the left shoulder and right hip with home ADL's / shoulder/hip  mobility. 2. Decrease DASH to 15 in the right shoulder to indicate functional improvement and to demonstrate improved strength and mobility of the left shoulder    3. Patient to be independent with an advanced HEP for the left shoulder for mobility and strengthening. Regarding [employer] therapy, I certify that the treatment plan above will be carried out by a therapist or under their direction. Thank you for this referral,  Lucia Maynard PT     Referring Physician Signature: Isabelle Galo MD              Date                    The information in this section was collected on 10/23/2017 (except where otherwise noted).     HISTORY:   History of Present Injury/Illness (Reason for Referral):  Patient is describing pain in the left shoulder with decreased elevation especially with initiation of abduction and capsular pattern. 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 She is reporting flare up of this pain for the past three months following the same pattern in the left shoulder. Past Medical History/Comorbidities:   Ms. Frantz Esparza  has a past medical history of Anxiety; Nausea & vomiting; and Panic attacks. She also has no past medical history of Aneurysm ([address] Arrhythmia; Arthritis; Asthma; Autoimmune disease ([address] CAD (coronary artery disease); Cancer ([address] Chronic kidney disease; Chronic pain; Coagulation defects; COPD; Diabetes ([address] Difficult intubation; GERD (gastroesophageal reflux disease); Heart failure ([address] Hypertension; Liver disease; Malignant hyperthermia due to anesthesia; Morbid obesity ([address] Other ill-defined conditions(799.89); Pseudocholinesterase deficiency; Psychiatric disorder; PUD (peptic ulcer disease); Seizures ([facility] [address] Stroke [facility]); Thromboembolus ([address] Thyroid disease; Unspecified adverse effect of anesthesia; or Unspecified sleep apnea. Ms. Frantz Esparza  has a past surgical history that includes hx tonsillectomy and hx orthopaedic (Right). Social History/Living Environment:     No limitations. Prior Level of Function/Work/Activity:  Patient had been independent with all functional mobility. Reports prior decrease in shoulder ROM secondary to prior frozen shoulder left. Dominant Side:         RIGHT   Personal Factors:          Sex:  female        Age:  56 y.o. Current Medications:       Current Outpatient Prescriptions:     estradiol (ESTRACE) 1 mg tablet, Take 1 Tab by mouth daily. , Disp: 90 Tab, Rfl: 3    medroxyPROGESTERone (PROVERA) 2.5 mg tablet, Take 1 Tab by mouth daily. , Disp: 90 Tab, Rfl: 3    citalopram (CELEXA) 40 mg tablet, Take 40 mg by mouth nightly.  , Disp: , Rfl:    Date Last Reviewed: 1/28/2018   Number of Personal Factors/Comorbidities that affect the Plan of Care: 1-2: MODERATE COMPLEXITY   EXAMINATION:     LEFT Shoulder                                    LEFT                              AROM   STRENGTH   PROM  NORMALS  Flexion                    80*          2+                85*              160  ABDUCTION          70*          2+                 75 *             160  Extension                60           4                  60                 60  ER                              60           4                  65                 85   IR                gluteal fold          2+            Gluteal fold    T12   Scapular strength at 3/5 . Strength:See Above    Palpation :    Palpably tender left anterior superior GHJ            Special Tests:     Painful and limited loss of GHJ range of motion for shoulder abduction and shoulder external rotation. Neurological Screen:    Myotomes: intact. Dermatomes: intact bilateral UE's light touch and proprioception. Reflexes: normal          Functional Mobility: intact  Balance: intact           Body Structures Involved:  1. Metabolic  2. Bones  3. Joints  4. Muscles  5. Ligaments Body Functions Affected:  1. Sensory/Pain  2. Neuromusculoskeletal  3. Movement Related  4. Skin Related  5. Metobolic/Endocrine Activities and Participation Affected:  1. Learning and Applying Knowledge  2. General Tasks and Demands  3. Mobility  4. Interpersonal Interactions and Relationships  5.  Community, Social and [place]   Number of elements (examined above) that affect the Plan of Care: 3: MODERATE COMPLEXITY   CLINICAL PRESENTATION:   Presentation: Evolving clinical presentation with changing clinical characteristics: MODERATE COMPLEXITY   CLINICAL DECISION MAKING:   Outcome Measure: 1/18/2018  Tool Used: Disabilities of the Arm, Shoulder and Hand (DASH) Questionnaire - Quick Version  Score:  Initial: 25/55  Most Recent: X/55 (Date: -- )   Interpretation of Score: The DASH is designed to measure the activities of daily living in person's with upper extremity dysfunction or pain. Each section is scored on a 1-5 scale, 5 representing the greatest disability. The scores of each section are added together for a total score of 55. Score 11 12-19 20-28 29-37 38-45 46-54 55   Modifier CH CI CJ CK CL CM CN     ? Changing and Maintaining Body Position:     - CURRENT STATUS: CJ - 20%-39% impaired, limited or restricted    - GOAL STATUS: CI - 1%-19% impaired, limited or restricted    - D/C STATUS:  ---------------To be determined---------------       Lower Extremity Functional Scale (LEFS)  Score:  Initial: 66/80 Most Recent: X/80 (Date: -- )   Interpretation of Score: 20 questions each scored on a 5 point scale with 0 representing \"extreme difficulty or unable to perform\" and 4 representing \"no difficulty\". The lower the score, the greater the functional disability. 80/80 represents no disability. Minimal detectable change is 9 points. Score 80 79-63 62-48 47-32 31-16 15-1 0   Modifier  CI CJ CK CL CM CN         Medical Necessity:   · Patient demonstrates good rehab potential due to higher previous functional level. Reason for Services/Other Comments:  · Patient continues to require skilled intervention due to medical complications, patient unable to attend/participate in therapy as expected and left shoulder impingement and pain with elevation. .   Use of outcome tool(s) and clinical judgement create a POC that gives a: Questionable prediction of patient's progress: MODERATE COMPLEXITY            TREATMENT:   (In addition to Assessment/Re-Assessment sessions the following treatments were rendered)  Pre-treatment Symptoms/Complaints:  Left shoulder at 2/10 with elevation and abduction much improved with palpable tenderness in the anterior GHJ. Pain: Initial:   2/10 in the left shoulder.      Post Session: 1/10 with elevation in the left shoulder        THERAPEUTIC EXERCISE: (30 minutes):  Exercises per grid below to improve mobility, strength, balance and dynamic movement of shoulder - left to improve functional lifting, carrying, reaching and catching. Required minimal visual, verbal, manual and tactile cues to promote proper body alignment. Progressed repetitions as indicated. Date:  1/25/2018 Date:   Date:     Activity/Exercise Parameters Parameters Parameters   Upper trap shrugs  10 x's      Scapular clocks  15 x's      Pulleys flexion/abduction  25 x's      UBE clockwise and counterclockwise  6 mins 3 forward and backward. Wand flexion, abduction, ER/IR, scaption  20 x's      PNF pattern D1 and D2 flexion and abduction  10 x's              Manual therapy :    10 mins :  Seated Trigger point digital accupressure to the left shoulder with sustained hold and effleurage and petrissage post 5 mins of hot pack to the left shoulder and scapular region and grade III anterior inferior shoulder mobs. 5 mins moist hot pack to the left shoulder before manual therapy. [facility]  Treatment/Session Assessment:    · Response to Treatment: much improved overall today with all mobilization and increased shoulder ROM. · Compliance with Program/Exercises:   Good compliance. · Recommendations/Intent for next treatment session: \"Next visit will focus on advancements to more challenging activities, reduction in assistance provided and left shoulder mobility and scapular strengthening to decrease impingement in the right shoulder. \".   Total Treatment Duration:  PT Patient Time In/Time Out  Time In: 1600  Time Out: [facility]

## 2018-01-29 ENCOUNTER — HOSPITAL ENCOUNTER (OUTPATIENT)
Dept: PHYSICAL THERAPY | Age: 58
Discharge: HOME | End: 2018-01-29
Payer: COMMERCIAL

## 2018-01-29 PROCEDURE — 97110 THERAPEUTIC EXERCISES: CPT

## 2018-01-29 PROCEDURE — 97140 MANUAL THERAPY 1/> REGIONS: CPT

## 2018-01-29 NOTE — PROGRESS NOTES
Phan Segundo  : 1960  Payor: Duy Queen / Plan: Lashawn Pineda PPO / Product Type: PPO /  [address]  at [facility]  April [birth year], [address], [place] [address] [address] [facility]  [phone]   [phone]       OUTPATIENT PHYSICAL THERAPY:Daily Note 2018    ICD-10: Treatment Diagnosis:  M75.51  Bursitis of right shoulder   M70.61  Trochanteric bursitis, right hip  Precautions/Allergies:   Review of patient's allergies indicates no known allergies. Fall Risk Score: 0 (? 5 = High Risk)  MD Orders: PT EVAL AND TREATMENT BURSITIS RIGHT SHOULDER and RIGHT HIP BURSITIS  MEDICAL/REFERRING DIAGNOSIS:  Complete rotator cuff tear or rupture of left shoulder, not specified as traumatic [M75.122]  Bursitis of right shoulder [M75.51]   Left Shoulder adhesive capsulitis   DATE OF ONSET: 2017   REFERRING PHYSICIAN: Marguerite Mena MD  RETURN PHYSICIAN APPOINTMENT:  Pending      INITIAL ASSESSMENT:    Phan Segundo is a  pleasant middle aged female with poor scapular posture and weakness. There is point tenderness in the left anterior shoulder region, the trochanter pain and right shoulder pain have diminished and are improved. She demonstrates pain with elevation left UE with restricted capsular external rotation pattern. Skilled PT is indicated for this patient's left shoulder for strengthening and mobility. LEFT                              AROM   STRENGTH   PROM  NORMALS  Flexion                    80*          2+                85*              160  ABDUCTION          70*          2+                 75 *             160  Extension                60           4                  60                 60  ER                              60           4                  65                 85   IR                gluteal fold          2+            Gluteal fold    T12   Scapular strength at 3/5 .       PROBLEM LIST (Impacting functional limitations):  1. Decreased Strength  2. Decreased ADL/Functional Activities  3. Decreased Activity Tolerance  4. Decreased Work Simplification/Energy Conservation Techniques  5. Decreased Flexibility/Joint Mobility  6. Decreased [place] with Home Exercise Program    INTERVENTIONS PLANNED :   1. Manual Therapy   2. Therapeutic Exercise   3. Modalities Cold and Hot               4.         Ultrasound    TREATMENT PLAN:  Effective Dates: 1/18/2018 TO 4/18/2018    Frequency/Duration: up to 3 times a week for 12 weeks for left shoulder    GOALS: (Goals have been discussed and agreed upon with patient.)  Rehabilitation Potential For Stated Goals: Good  Time Frame: 6 weeks   1. Decrease pain below 3/10 for the left shoulder with beginning home ADL's / shoulder mobility. 2. Decrease DASH to 20 in the left shoulder to indicate functional improvement and to demonstrate improved range of motion and functional improvement of the shoulder. 3. Patient to be independent with an initial HEP for the left shoulder for mobility and strengthening. DISCHARGE GOALS:   12 weeks       1. Decrease pain below 1/10 for the left shoulder and right hip with home ADL's / shoulder/hip  mobility. 2. Decrease DASH to 15 in the right shoulder to indicate functional improvement and to demonstrate improved strength and mobility of the left shoulder    3. Patient to be independent with an advanced HEP for the left shoulder for mobility and strengthening. Regarding [employer] therapy, I certify that the treatment plan above will be carried out by a therapist or under their direction. Thank you for this referral,  [address] [place] [address],[address], PT     Referring Physician Signature: Meron Hummel MD              Date                    The information in this section was collected on 10/23/2017 (except where otherwise noted).     HISTORY:   History of Present Injury/Illness (Reason for Referral):  Patient is describing pain in the left shoulder with decreased elevation especially with initiation of abduction and capsular pattern. 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 She is reporting flare up of this pain for the past three months following the same pattern in the left shoulder. Past Medical History/Comorbidities:   Ms. Malcolm Alvarado  has a past medical history of Anxiety; Nausea & vomiting; and Panic attacks. She also has no past medical history of Aneurysm ([address] Arrhythmia; Arthritis; Asthma; Autoimmune disease ([address] CAD (coronary artery disease); Cancer ([address] Chronic kidney disease; Chronic pain; Coagulation defects; COPD; Diabetes ([address] Difficult intubation; GERD (gastroesophageal reflux disease); Heart failure ([address] Hypertension; Liver disease; Malignant hyperthermia due to anesthesia; Morbid obesity ([address] Other ill-defined conditions(799.89); Pseudocholinesterase deficiency; Psychiatric disorder; PUD (peptic ulcer disease); Seizures ([facility] [address] Stroke [facility]); Thromboembolus ([address] Thyroid disease; Unspecified adverse effect of anesthesia; or Unspecified sleep apnea. Ms. Malcolm Alvarado  has a past surgical history that includes hx tonsillectomy and hx orthopaedic (Right). Social History/Living Environment:     No limitations. Prior Level of Function/Work/Activity:  Patient had been independent with all functional mobility. Reports prior decrease in shoulder ROM secondary to prior frozen shoulder left. Dominant Side:         RIGHT   Personal Factors:          Sex:  female        Age:  56 y.o. Current Medications:       Current Outpatient Prescriptions:     estradiol (ESTRACE) 1 mg tablet, Take 1 Tab by mouth daily. , Disp: 90 Tab, Rfl: 3    medroxyPROGESTERone (PROVERA) 2.5 mg tablet, Take 1 Tab by mouth daily. , Disp: 90 Tab, Rfl: 3    citalopram (CELEXA) 40 mg tablet, Take 40 mg by mouth nightly.  , Disp: , Rfl:    Date Last Reviewed: 1/30/2018   Number of Personal Factors/Comorbidities that affect the Plan of Care: 1-2: MODERATE COMPLEXITY   EXAMINATION:     LEFT Shoulder                                    LEFT                              AROM   STRENGTH   PROM  NORMALS  Flexion                    80*          2+                85*              160  ABDUCTION          70*          2+                 75 *             160  Extension                60           4                  60                 60  ER                              60           4                  65                 85   IR                gluteal fold          2+            Gluteal fold    T12   Scapular strength at 3/5 . Strength:See Above    Palpation :    Palpably tender left anterior superior GHJ            Special Tests:     Painful and limited loss of GHJ range of motion for shoulder abduction and shoulder external rotation. Neurological Screen:    Myotomes: intact. Dermatomes: intact bilateral UE's light touch and proprioception. Reflexes: normal          Functional Mobility: intact  Balance: intact           Body Structures Involved:  1. Metabolic  2. Bones  3. Joints  4. Muscles  5. Ligaments Body Functions Affected:  1. Sensory/Pain  2. Neuromusculoskeletal  3. Movement Related  4. Skin Related  5. Metobolic/Endocrine Activities and Participation Affected:  1. Learning and Applying Knowledge  2. General Tasks and Demands  3. Mobility  4. Interpersonal Interactions and Relationships  5.  Community, Social and [place]   Number of elements (examined above) that affect the Plan of Care: 3: MODERATE COMPLEXITY   CLINICAL PRESENTATION:   Presentation: Evolving clinical presentation with changing clinical characteristics: MODERATE COMPLEXITY   CLINICAL DECISION MAKING:   Outcome Measure: 1/18/2018  Tool Used: Disabilities of the Arm, Shoulder and Hand (DASH) Questionnaire - Quick Version  Score:  Initial: 25/55  Most Recent: X/55 (Date: -- )   Interpretation of Score: The DASH is designed to measure the activities of daily living in person's with upper extremity dysfunction or pain. Each section is scored on a 1-5 scale, 5 representing the greatest disability. The scores of each section are added together for a total score of 55. Score 11 12-19 20-28 29-37 38-45 46-54 55   Modifier CH CI CJ CK CL CM CN     ? Changing and Maintaining Body Position:     - CURRENT STATUS: CJ - 20%-39% impaired, limited or restricted    - GOAL STATUS: CI - 1%-19% impaired, limited or restricted    - D/C STATUS:  ---------------To be determined---------------       Lower Extremity Functional Scale (LEFS)  Score:  Initial: 66/80 Most Recent: X/80 (Date: -- )   Interpretation of Score: 20 questions each scored on a 5 point scale with 0 representing \"extreme difficulty or unable to perform\" and 4 representing \"no difficulty\". The lower the score, the greater the functional disability. 80/80 represents no disability. Minimal detectable change is 9 points. Score 80 79-63 62-48 47-32 31-16 15-1 0   Modifier  CI CJ CK CL CM CN         Medical Necessity:   · Patient demonstrates good rehab potential due to higher previous functional level. Reason for Services/Other Comments:  · Patient continues to require skilled intervention due to medical complications, patient unable to attend/participate in therapy as expected and left shoulder impingement and pain with elevation. .   Use of outcome tool(s) and clinical judgement create a POC that gives a: Questionable prediction of patient's progress: MODERATE COMPLEXITY            TREATMENT:   (In addition to Assessment/Re-Assessment sessions the following treatments were rendered)  Pre-treatment Symptoms/Complaints:  Left shoulder at 3/10 with elevation and abduction much improved with palpable tenderness in the anterior GHJ. Pain: Initial:   3/10 in the left shoulder.      Post Session: 2/10 with elevation in the left shoulder        THERAPEUTIC EXERCISE: (30 minutes):  Exercises per grid below to improve mobility, strength, balance and dynamic movement of shoulder - left to improve functional lifting, carrying, reaching and catching. Required minimal visual, verbal, manual and tactile cues to promote proper body alignment. Progressed repetitions as indicated. Date:  1/29/2018 Date:   Date:     Activity/Exercise Parameters Parameters Parameters   Upper trap shrugs  10 x's      Scapular clocks  15 x's      Pulleys flexion/abduction  25 x's      UBE clockwise and counterclockwise  6 mins 3 forward and backward. Wand flexion, abduction, ER/IR, scaption  20 x's      PNF pattern D1 and D2 flexion and abduction  10 x's              Manual therapy :    10 mins :  Seated Trigger point digital accupressure to the left shoulder with sustained hold and effleurage and petrissage post 5 mins of hot pack to the left shoulder and scapular region and grade III anterior inferior shoulder mobs. 5 mins moist hot pack to the left shoulder before manual therapy and cold pack for the shoulder for 5 mins . [facility] [employer] [facility] Portal  Treatment/Session Assessment:    · Response to Treatment: much improved overall today with all mobilization and increased shoulder ROM. · Compliance with Program/Exercises:   Good compliance with initial HEP       · Recommendations/Intent for next treatment session: \"Next visit will focus on advancements to more challenging activities, reduction in assistance provided and left shoulder mobility and scapular strengthening to decrease impingement in the right shoulder. \".   Total Treatment Duration:  PT Patient Time In/Time Out  Time In: 1600  Time Out: [address] [place], [place]

## 2018-02-01 ENCOUNTER — APPOINTMENT (OUTPATIENT)
Dept: PHYSICAL THERAPY | Age: 58
End: 2018-02-01
Payer: COMMERCIAL

## 2018-02-12 ENCOUNTER — HOSPITAL ENCOUNTER (OUTPATIENT)
Dept: PHYSICAL THERAPY | Age: 58
Discharge: HOME | End: 2018-02-12
Payer: COMMERCIAL

## 2018-02-12 PROCEDURE — 97110 THERAPEUTIC EXERCISES: CPT

## 2018-02-12 PROCEDURE — 97140 MANUAL THERAPY 1/> REGIONS: CPT

## 2018-02-12 NOTE — PROGRESS NOTES
Phan Segundo  : 1960  Payor: Duy Queen / Plan: Lashawn Pineda PPO / Product Type: PPO /  [address]  at [facility]  April 70, [address], [place] [address] [address] [facility]  [phone]   [phone]       OUTPATIENT PHYSICAL THERAPY:Daily Note 2018    ICD-10: Treatment Diagnosis:  M75.51  Bursitis of right shoulder   M70.61  Trochanteric bursitis, right hip  Precautions/Allergies:   Review of patient's allergies indicates no known allergies. Fall Risk Score: 0 (? 5 = High Risk)  MD Orders: PT EVAL AND TREATMENT BURSITIS RIGHT SHOULDER and RIGHT HIP BURSITIS  MEDICAL/REFERRING DIAGNOSIS:  Complete rotator cuff tear or rupture of left shoulder, not specified as traumatic [M75.122]  Bursitis of right shoulder [M75.51]   Left Shoulder adhesive capsulitis   DATE OF ONSET: 2017   REFERRING PHYSICIAN: Marguerite Mena MD  RETURN PHYSICIAN APPOINTMENT:  Pending      INITIAL ASSESSMENT:    Phan Segundo is a  pleasant middle aged female with poor scapular posture and weakness. There is point tenderness in the left anterior shoulder region, the trochanter pain and right shoulder pain have diminished and are improved. She demonstrates pain with elevation left UE with restricted capsular external rotation pattern. Skilled PT is indicated for this patient's left shoulder for strengthening and mobility. LEFT                              AROM   STRENGTH   PROM  NORMALS  Flexion                    80*          2+                85*              160  ABDUCTION          70*          2+                 75 *             160  Extension                60           4                  60                 60  ER                              60           4                  65                 85   IR                gluteal fold          2+            Gluteal fold    T12   Scapular strength at 3/5 .       PROBLEM LIST (Impacting functional limitations):  1. Decreased Strength  2. Decreased ADL/Functional Activities  3. Decreased Activity Tolerance  4. Decreased Work Simplification/Energy Conservation Techniques  5. Decreased Flexibility/Joint Mobility  6. Decreased [place] with Home Exercise Program    INTERVENTIONS PLANNED :   1. Manual Therapy   2. Therapeutic Exercise   3. Modalities Cold and Hot               4.         Ultrasound    TREATMENT PLAN:  Effective Dates: 1/18/2018 TO 4/18/2018    Frequency/Duration: up to 3 times a week for 12 weeks for left shoulder    GOALS: (Goals have been discussed and agreed upon with patient.)  Rehabilitation Potential For Stated Goals: Good  Time Frame: 6 weeks   1. Decrease pain below 3/10 for the left shoulder with beginning home ADL's / shoulder mobility. 2. Decrease DASH to 20 in the left shoulder to indicate functional improvement and to demonstrate improved range of motion and functional improvement of the shoulder. 3. Patient to be independent with an initial HEP for the left shoulder for mobility and strengthening. DISCHARGE GOALS:   12 weeks       1. Decrease pain below 1/10 for the left shoulder and right hip with home ADL's / shoulder/hip  mobility. 2. Decrease DASH to 15 in the right shoulder to indicate functional improvement and to demonstrate improved strength and mobility of the left shoulder    3. Patient to be independent with an advanced HEP for the left shoulder for mobility and strengthening. Regarding [employer] therapy, I certify that the treatment plan above will be carried out by a therapist or under their direction. Thank you for this referral,  Deandra Fernandes PT     Referring Physician Signature: Herb Tinajero MD              Date                    The information in this section was collected on 10/23/2017 (except where otherwise noted).     HISTORY:   History of Present Injury/Illness (Reason for Referral):  Patient is describing pain in the left shoulder with decreased elevation especially with initiation of abduction and capsular pattern. 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 She is reporting flare up of this pain for the past three months following the same pattern in the left shoulder. Past Medical History/Comorbidities:   Ms. Joel Corrigan  has a past medical history of Anxiety; Nausea & vomiting; and Panic attacks. She also has no past medical history of Aneurysm ([address] Arrhythmia; Arthritis; Asthma; Autoimmune disease ([address] CAD (coronary artery disease); Cancer ([address] Chronic kidney disease; Chronic pain; Coagulation defects; COPD; Diabetes ([address] Difficult intubation; GERD (gastroesophageal reflux disease); Heart failure ([address] Hypertension; Liver disease; Malignant hyperthermia due to anesthesia; Morbid obesity ([address] Other ill-defined conditions(799.89); Pseudocholinesterase deficiency; Psychiatric disorder; PUD (peptic ulcer disease); Seizures ([facility] [address] Stroke [facility]); Thromboembolus ([facility] [address] Thyroid disease; Unspecified adverse effect of anesthesia; or Unspecified sleep apnea. Ms. Joel Corrigan  has a past surgical history that includes hx tonsillectomy and hx orthopaedic (Right). Social History/Living Environment:     No limitations. Prior Level of Function/Work/Activity:  Patient had been independent with all functional mobility. Reports prior decrease in shoulder ROM secondary to prior frozen shoulder left. Dominant Side:         RIGHT   Personal Factors:          Sex:  female        Age:  56 y.o. Current Medications:       Current Outpatient Prescriptions:     estradiol (ESTRACE) 1 mg tablet, Take 1 Tab by mouth daily. , Disp: 90 Tab, Rfl: 3    medroxyPROGESTERone (PROVERA) 2.5 mg tablet, Take 1 Tab by mouth daily. , Disp: 90 Tab, Rfl: 3    citalopram (CELEXA) 40 mg tablet, Take 40 mg by mouth nightly.  , Disp: , Rfl:    Date Last Reviewed: 2/12/2018   Number of Personal Factors/Comorbidities that affect the Plan of Care: 1-2: MODERATE COMPLEXITY   EXAMINATION:     LEFT Shoulder                                    LEFT                              AROM   STRENGTH   PROM  NORMALS  Flexion                    80*          2+                85*              160  ABDUCTION          70*          2+                 75 *             160  Extension                60           4                  60                 60  ER                              60           4                  65                 85   IR                gluteal fold          2+            Gluteal fold    T12   Scapular strength at 3/5 . Strength:See Above    Palpation :    Palpably tender left anterior superior GHJ            Special Tests:     Painful and limited loss of GHJ range of motion for shoulder abduction and shoulder external rotation. Neurological Screen:    Myotomes: intact. Dermatomes: intact bilateral UE's light touch and proprioception. Reflexes: normal          Functional Mobility: intact  Balance: intact           Body Structures Involved:  1. Metabolic  2. Bones  3. Joints  4. Muscles  5. Ligaments Body Functions Affected:  1. Sensory/Pain  2. Neuromusculoskeletal  3. Movement Related  4. Skin Related  5. Metobolic/Endocrine Activities and Participation Affected:  1. Learning and Applying Knowledge  2. General Tasks and Demands  3. Mobility  4. Interpersonal Interactions and Relationships  5.  Community, Social and [place]   Number of elements (examined above) that affect the Plan of Care: 3: MODERATE COMPLEXITY   CLINICAL PRESENTATION:   Presentation: Evolving clinical presentation with changing clinical characteristics: MODERATE COMPLEXITY   CLINICAL DECISION MAKING:   Outcome Measure: 1/18/2018  Tool Used: Disabilities of the Arm, Shoulder and Hand (DASH) Questionnaire - Quick Version  Score:  Initial: 25/55  Most Recent: X/55 (Date: -- )   Interpretation of Score: The DASH is designed to measure the activities of daily living in person's with upper extremity dysfunction or pain. Each section is scored on a 1-5 scale, 5 representing the greatest disability. The scores of each section are added together for a total score of 55. Score 11 12-19 20-28 29-37 38-45 46-54 55   Modifier CH CI CJ CK CL CM CN     ? Changing and Maintaining Body Position:     - CURRENT STATUS: CJ - 20%-39% impaired, limited or restricted    - GOAL STATUS: CI - 1%-19% impaired, limited or restricted    - D/C STATUS:  ---------------To be determined---------------       Lower Extremity Functional Scale (LEFS)  Score:  Initial: 66/80 Most Recent: X/80 (Date: -- )   Interpretation of Score: 20 questions each scored on a 5 point scale with 0 representing \"extreme difficulty or unable to perform\" and 4 representing \"no difficulty\". The lower the score, the greater the functional disability. 80/80 represents no disability. Minimal detectable change is 9 points. Score 80 79-63 62-48 47-32 31-16 15-1 0   Modifier  CI CJ CK CL CM CN         Medical Necessity:   · Patient demonstrates good rehab potential due to higher previous functional level. Reason for Services/Other Comments:  · Patient continues to require skilled intervention due to medical complications, patient unable to attend/participate in therapy as expected and left shoulder impingement and pain with elevation. .   Use of outcome tool(s) and clinical judgement create a POC that gives a: Questionable prediction of patient's progress: MODERATE COMPLEXITY            TREATMENT:   (In addition to Assessment/Re-Assessment sessions the following treatments were rendered)  Pre-treatment Symptoms/Complaints:  Left shoulder at 4/10 with elevation and abduction much improved with palpable tenderness in the anterior GHJ. Pain: Initial:   4/10 in the left shoulder.      Post Session: 3/10 with elevation in the left shoulder        THERAPEUTIC EXERCISE: (30 minutes):  Exercises per grid below to improve mobility, strength, balance and dynamic movement of shoulder - left to improve functional lifting, carrying, reaching and catching. Required minimal visual, verbal, manual and tactile cues to promote proper body alignment. Progressed repetitions as indicated. Date:  2/12/2018 Date:   Date:     Activity/Exercise Parameters Parameters Parameters   Upper trap shrugs  10 x's      Scapular clocks  15 x's      Pulleys flexion/abduction  25 x's      UBE clockwise and counterclockwise  6 mins 3 forward and backward. Wand flexion, abduction, ER/IR, scaption  20 x's      PNF pattern D1 and D2 flexion and abduction  10 x's              Manual therapy :    10 mins :  Seated Trigger point digital accupressure to the left shoulder with sustained hold and effleurage and petrissage post 5 mins of hot pack to the left shoulder and scapular region and grade III anterior inferior shoulder mobs. 10 mins moist hot pack to the left shoulder before manual therapy   [facility] Portal  Treatment/Session Assessment:    · Response to Treatment: much improved overall today with all mobilization and increased shoulder ROM. · Compliance with Program/Exercises:   Good compliance with initial HEP       · Recommendations/Intent for next treatment session: \"Next visit will focus on advancements to more challenging activities, reduction in assistance provided and left shoulder mobility and scapular strengthening to decrease impingement in the right shoulder. \".   Total Treatment Duration:  PT Patient Time In/Time Out  Time In: 1600  Time Out: [address], [place]

## 2018-02-19 ENCOUNTER — APPOINTMENT (OUTPATIENT)
Dept: PHYSICAL THERAPY | Age: 58
End: 2018-02-19
Payer: COMMERCIAL

## 2018-03-22 NOTE — THERAPY DISCHARGE
Winnie Cotter has been seen in physical therapy from 1/18/2018 to 2/19/2018 for 4 visits. Treatment has been discontinued at this time due to patient failing to return for additional treatment. The below goals were met prior to discontinuation. Some goals were not met due to patient not returning for additional PT visits. Thank you for this referral.   Cathy Carreno, PT, DPT, OCS.

## 2019-07-24 ENCOUNTER — HOSPITAL ENCOUNTER (OUTPATIENT)
Dept: MAMMOGRAPHY | Age: 59
Discharge: HOME | End: 2019-07-24
Attending: OBSTETRICS & GYNECOLOGY
Payer: COMMERCIAL

## 2019-07-24 DIAGNOSIS — Z12.31 VISIT FOR SCREENING MAMMOGRAM: ICD-10-CM

## 2019-07-24 PROCEDURE — 77067 SCR MAMMO BI INCL CAD: CPT

## 2021-04-13 ENCOUNTER — APPOINTMENT (RX ONLY)
Dept: URBAN - METROPOLITAN AREA CLINIC 349 | Facility: CLINIC | Age: 61
Setting detail: DERMATOLOGY
End: 2021-04-13

## 2021-04-13 DIAGNOSIS — B35.1 TINEA UNGUIUM: ICD-10-CM

## 2021-04-13 DIAGNOSIS — L70.0 ACNE VULGARIS: ICD-10-CM

## 2021-04-13 DIAGNOSIS — L57.8 OTHER SKIN CHANGES DUE TO CHRONIC EXPOSURE TO NONIONIZING RADIATION: ICD-10-CM

## 2021-04-13 PROCEDURE — ? OTHER

## 2021-04-13 PROCEDURE — ? PRESCRIPTION

## 2021-04-13 PROCEDURE — ? COUNSELING

## 2021-04-13 PROCEDURE — ? PRESCRIPTION MEDICATION MANAGEMENT

## 2021-04-13 PROCEDURE — ? PHOTO-DOCUMENTATION

## 2021-04-13 PROCEDURE — 99204 OFFICE O/P NEW MOD 45 MIN: CPT

## 2021-04-13 RX ORDER — CLINDAMYCIN PHOSPHATE AND BENZOYL PEROXIDE 10; 37.5 MG/G; MG/G
GEL TOPICAL
Qty: 1 | Refills: 3 | Status: ERX | COMMUNITY
Start: 2021-04-13

## 2021-04-13 RX ORDER — EFINACONAZOLE 100 MG/ML
SOLUTION TOPICAL
Qty: 1 | Refills: 12 | Status: ERX | COMMUNITY
Start: 2021-04-13

## 2021-04-13 RX ADMIN — CLINDAMYCIN PHOSPHATE AND BENZOYL PEROXIDE: 10; 37.5 GEL TOPICAL at 00:00

## 2021-04-13 RX ADMIN — EFINACONAZOLE: 100 SOLUTION TOPICAL at 00:00

## 2021-04-13 ASSESSMENT — LOCATION ZONE DERM
LOCATION ZONE: FACE
LOCATION ZONE: TOENAIL

## 2021-04-13 ASSESSMENT — LOCATION SIMPLE DESCRIPTION DERM
LOCATION SIMPLE: LEFT GREAT TOE
LOCATION SIMPLE: LEFT CHEEK
LOCATION SIMPLE: RIGHT CHEEK

## 2021-04-13 ASSESSMENT — LOCATION DETAILED DESCRIPTION DERM
LOCATION DETAILED: LEFT INFERIOR CENTRAL MALAR CHEEK
LOCATION DETAILED: LEFT CENTRAL MALAR CHEEK
LOCATION DETAILED: RIGHT INFERIOR CENTRAL MALAR CHEEK
LOCATION DETAILED: LEFT MEDIAL MALAR CHEEK
LOCATION DETAILED: RIGHT CENTRAL MALAR CHEEK
LOCATION DETAILED: LEFT GREAT TOENAIL

## 2021-04-13 NOTE — PROCEDURE: PRESCRIPTION MEDICATION MANAGEMENT
Detail Level: Zone
Render In Strict Bullet Format?: No
Initiate Treatment: Will start [place] and will be sent to [facility].
Initiate Treatment: [place] 10%.
Plan: Patient states this could be due to wearing a mask consistently.\\nPatient states she is also beginning stages of menopause.
Plan: Discussed using hydroquinone. Patient defers prescription at this time.

## 2021-04-13 NOTE — PROCEDURE: OTHER
Other (Free Text): Patient states she uses Vics vapor rub on this as well as Tea Tree Oil.\\nUnable to review records as they were not sent in from [facility].\\nDiscussed prescribing Jublia. Patient agrees with this. This will [facility] to [facility]. \\nDiscussed performing a culture today. Patient defers at this time.
Render Risk Assessment In Note?: yes
Note Text (......Xxx Chief Complaint.): This diagnosis correlates with the
Detail Level: Detailed

## 2021-04-13 NOTE — PROCEDURE: COUNSELING
Sarecycline Counseling: Patient advised regarding possible photosensitivity and discoloration of the teeth, skin, lips, tongue and gums.  Patient instructed to avoid sunlight, if possible.  When exposed to sunlight, patients should wear protective clothing, sunglasses, and sunscreen.  The patient was instructed to call the office immediately if the following severe adverse effects occur:  hearing changes, easy bruising/bleeding, severe headache, or vision changes.  The patient verbalized understanding of the proper use and possible adverse effects of sarecycline.  All of the patient's questions and concerns were addressed.
Detail Level: Simple
Birth Control Pills Pregnancy And Lactation Text: This medication should be avoided if pregnant and for the first 30 days post-partum.
Dapsone Pregnancy And Lactation Text: This medication is Pregnancy Category C and is not considered safe during pregnancy or breast feeding.
Tazorac Counseling:  Patient advised that medication is irritating and drying.  Patient may need to apply sparingly and wash off after an hour before eventually leaving it on overnight.  The patient verbalized understanding of the proper use and possible adverse effects of tazorac.  All of the patient's questions and concerns were addressed.
Azithromycin Pregnancy And Lactation Text: This medication is considered safe during pregnancy and is also secreted in breast milk.
Erythromycin Pregnancy And Lactation Text: This medication is Pregnancy Category B and is considered safe during pregnancy. It is also excreted in breast milk.
Tetracycline Counseling: Patient counseled regarding possible photosensitivity and increased risk for sunburn.  Patient instructed to avoid sunlight, if possible.  When exposed to sunlight, patients should wear protective clothing, sunglasses, and sunscreen.  The patient was instructed to call the office immediately if the following severe adverse effects occur:  hearing changes, easy bruising/bleeding, severe headache, or vision changes.  The patient verbalized understanding of the proper use and possible adverse effects of tetracycline.  All of the patient's questions and concerns were addressed. Patient understands to avoid pregnancy while on therapy due to potential birth defects.
Bactrim Pregnancy And Lactation Text: This medication is Pregnancy Category D and is known to cause fetal risk.  It is also excreted in breast milk.
Bactrim Counseling:  I discussed with the patient the risks of sulfa antibiotics including but not limited to GI upset, allergic reaction, drug rash, diarrhea, dizziness, photosensitivity, and yeast infections.  Rarely, more serious reactions can occur including but not limited to aplastic anemia, agranulocytosis, methemoglobinemia, blood dyscrasias, liver or kidney failure, lung infiltrates or desquamative/blistering drug rashes.
Topical Retinoid Pregnancy And Lactation Text: This medication is Pregnancy Category C. It is unknown if this medication is excreted in breast milk.
Dapsone Counseling: I discussed with the patient the risks of dapsone including but not limited to hemolytic anemia, agranulocytosis, rashes, methemoglobinemia, kidney failure, peripheral neuropathy, headaches, GI upset, and liver toxicity.  Patients who start dapsone require monitoring including baseline LFTs and weekly CBCs for the first month, then every month thereafter.  The patient verbalized understanding of the proper use and possible adverse effects of dapsone.  All of the patient's questions and concerns were addressed.
Topical Clindamycin Pregnancy And Lactation Text: This medication is Pregnancy Category B and is considered safe during pregnancy. It is unknown if it is excreted in breast milk.
Topical Sulfur Applications Pregnancy And Lactation Text: This medication is Pregnancy Category C and has an unknown safety profile during pregnancy. It is unknown if this topical medication is excreted in breast milk.
Birth Control Pills Counseling: Birth Control Pill Counseling: I discussed with the patient the potential side effects of OCPs including but not limited to increased risk of stroke, heart attack, thrombophlebitis, deep venous thrombosis, hepatic adenomas, breast changes, GI upset, headaches, and depression.  The patient verbalized understanding of the proper use and possible adverse effects of OCPs. All of the patient's questions and concerns were addressed.
Spironolactone Pregnancy And Lactation Text: This medication can cause feminization of the male fetus and should be avoided during pregnancy. The active metabolite is also found in breast milk.
Minocycline Pregnancy And Lactation Text: This medication is Pregnancy Category D and not consider safe during pregnancy. It is also excreted in breast milk.
Spironolactone Counseling: Patient advised regarding risks of diarrhea, abdominal pain, hyperkalemia, birth defects (for female patients), liver toxicity and renal toxicity. The patient may need blood work to monitor liver and kidney function and potassium levels while on therapy. The patient verbalized understanding of the proper use and possible adverse effects of spironolactone.  All of the patient's questions and concerns were addressed.
Erythromycin Counseling:  I discussed with the patient the risks of erythromycin including but not limited to GI upset, allergic reaction, drug rash, diarrhea, increase in liver enzymes, and yeast infections.
Isotretinoin Counseling: Patient should get monthly blood tests, not donate blood, not drive at night if vision affected, not share medication, and not undergo elective surgery for 6 months after tx completed. Side effects reviewed, pt to contact office should one occur.
Azithromycin Counseling:  I discussed with the patient the risks of azithromycin including but not limited to GI upset, allergic reaction, drug rash, diarrhea, and yeast infections.
Topical Retinoid counseling:  Patient advised to apply a pea-sized amount only at bedtime and wait 30 minutes after washing their face before applying.  If too drying, patient may add a non-comedogenic moisturizer. The patient verbalized understanding of the proper use and possible adverse effects of retinoids.  All of the patient's questions and concerns were addressed.
High Dose Vitamin A Counseling: Side effects reviewed, pt to contact office should one occur.
Detail Level: Detailed
Doxycycline Pregnancy And Lactation Text: This medication is Pregnancy Category D and not consider safe during pregnancy. It is also excreted in breast milk but is considered safe for shorter treatment courses.
Topical Sulfur Applications Counseling: Topical Sulfur Counseling: Patient counseled that this medication may cause skin irritation or allergic reactions.  In the event of skin irritation, the patient was advised to reduce the amount of the drug applied or use it less frequently.   The patient verbalized understanding of the proper use and possible adverse effects of topical sulfur application.  All of the patient's questions and concerns were addressed.
Use Enhanced Medication Counseling?: No
Doxycycline Counseling:  Patient counseled regarding possible photosensitivity and increased risk for sunburn.  Patient instructed to avoid sunlight, if possible.  When exposed to sunlight, patients should wear protective clothing, sunglasses, and sunscreen.  The patient was instructed to call the office immediately if the following severe adverse effects occur:  hearing changes, easy bruising/bleeding, severe headache, or vision changes.  The patient verbalized understanding of the proper use and possible adverse effects of doxycycline.  All of the patient's questions and concerns were addressed.
Benzoyl Peroxide Counseling: Patient counseled that medicine may cause skin irritation and bleach clothing.  In the event of skin irritation, the patient was advised to reduce the amount of the drug applied or use it less frequently.   The patient verbalized understanding of the proper use and possible adverse effects of benzoyl peroxide.  All of the patient's questions and concerns were addressed.
Benzoyl Peroxide Pregnancy And Lactation Text: This medication is Pregnancy Category C. It is unknown if benzoyl peroxide is excreted in breast milk.
Minocycline Counseling: Patient advised regarding possible photosensitivity and discoloration of the teeth, skin, lips, tongue and gums.  Patient instructed to avoid sunlight, if possible.  When exposed to sunlight, patients should wear protective clothing, sunglasses, and sunscreen.  The patient was instructed to call the office immediately if the following severe adverse effects occur:  hearing changes, easy bruising/bleeding, severe headache, or vision changes.  The patient verbalized understanding of the proper use and possible adverse effects of minocycline.  All of the patient's questions and concerns were addressed.
Topical Clindamycin Counseling: Patient counseled that this medication may cause skin irritation or allergic reactions.  In the event of skin irritation, the patient was advised to reduce the amount of the drug applied or use it less frequently.   The patient verbalized understanding of the proper use and possible adverse effects of clindamycin.  All of the patient's questions and concerns were addressed.
Tazorac Pregnancy And Lactation Text: This medication is not safe during pregnancy. It is unknown if this medication is excreted in breast milk.
High Dose Vitamin A Pregnancy And Lactation Text: High dose vitamin A therapy is contraindicated during pregnancy and breast feeding.
Isotretinoin Pregnancy And Lactation Text: This medication is Pregnancy Category X and is considered extremely dangerous during pregnancy. It is unknown if it is excreted in breast milk.

## 2021-07-12 ENCOUNTER — TRANSCRIBE ORDER (OUTPATIENT)
Dept: SCHEDULING | Age: 61
End: 2021-07-12

## 2021-07-12 DIAGNOSIS — Z12.31 ENCOUNTER FOR SCREENING MAMMOGRAM FOR MALIGNANT NEOPLASM OF BREAST: Primary | ICD-10-CM

## 2021-07-14 ENCOUNTER — HOSPITAL ENCOUNTER (OUTPATIENT)
Dept: MAMMOGRAPHY | Age: 61
Discharge: HOME | End: 2021-07-14
Attending: OBSTETRICS & GYNECOLOGY

## 2021-07-14 DIAGNOSIS — Z12.31 ENCOUNTER FOR SCREENING MAMMOGRAM FOR MALIGNANT NEOPLASM OF BREAST: ICD-10-CM

## 2023-03-21 ENCOUNTER — TRANSCRIBE ORDERS (OUTPATIENT)
Dept: SCHEDULING | Age: 63
End: 2023-03-21

## 2023-03-21 DIAGNOSIS — Z12.31 SCREENING MAMMOGRAM FOR HIGH-RISK PATIENT: Primary | ICD-10-CM

## 2023-03-24 ENCOUNTER — HOSPITAL ENCOUNTER (OUTPATIENT)
Dept: MAMMOGRAPHY | Age: 63
Discharge: HOME | End: 2023-03-24
Payer: COMMERCIAL

## 2023-03-24 DIAGNOSIS — Z12.31 SCREENING MAMMOGRAM FOR HIGH-RISK PATIENT: ICD-10-CM

## 2023-03-24 PROCEDURE — 77063 BREAST TOMOSYNTHESIS BI: CPT

## 2024-07-15 ENCOUNTER — TRANSCRIBE ORDERS (OUTPATIENT)
Dept: SCHEDULING | Age: 64
End: 2024-07-15

## 2024-07-15 ENCOUNTER — HOSPITAL ENCOUNTER (OUTPATIENT)
Dept: MAMMOGRAPHY | Age: 64
Discharge: HOME | End: 2024-07-18
Payer: COMMERCIAL

## 2024-07-15 VITALS — BODY MASS INDEX: 21.66 KG/M2 | HEIGHT: 67 IN | WEIGHT: 138 LBS

## 2024-07-15 DIAGNOSIS — Z12.31 SCREENING MAMMOGRAM FOR HIGH-RISK PATIENT: Primary | ICD-10-CM

## 2024-07-15 DIAGNOSIS — Z12.31 SCREENING MAMMOGRAM FOR HIGH-RISK PATIENT: ICD-10-CM

## 2024-07-15 PROCEDURE — 77063 BREAST TOMOSYNTHESIS BI: CPT
